# Patient Record
Sex: MALE | Race: AMERICAN INDIAN OR ALASKA NATIVE | URBAN - METROPOLITAN AREA
[De-identification: names, ages, dates, MRNs, and addresses within clinical notes are randomized per-mention and may not be internally consistent; named-entity substitution may affect disease eponyms.]

---

## 2017-06-27 PROBLEM — Z00.00 ENCOUNTER FOR PREVENTIVE HEALTH EXAMINATION: Status: ACTIVE | Noted: 2017-06-27

## 2018-11-18 ENCOUNTER — INPATIENT (INPATIENT)
Facility: HOSPITAL | Age: 58
LOS: 2 days | Discharge: HOME | End: 2018-11-21
Attending: INTERNAL MEDICINE | Admitting: INTERNAL MEDICINE
Payer: COMMERCIAL

## 2018-11-18 VITALS
OXYGEN SATURATION: 99 % | TEMPERATURE: 97 F | HEART RATE: 96 BPM | SYSTOLIC BLOOD PRESSURE: 269 MMHG | RESPIRATION RATE: 18 BRPM | DIASTOLIC BLOOD PRESSURE: 145 MMHG

## 2018-11-18 LAB
ALBUMIN SERPL ELPH-MCNC: 5.1 G/DL — SIGNIFICANT CHANGE UP (ref 3.5–5.2)
ALP SERPL-CCNC: 95 U/L — SIGNIFICANT CHANGE UP (ref 30–115)
ALT FLD-CCNC: 29 U/L — SIGNIFICANT CHANGE UP (ref 0–41)
ANION GAP SERPL CALC-SCNC: 17 MMOL/L — HIGH (ref 7–14)
APTT BLD: 33.2 SEC — SIGNIFICANT CHANGE UP (ref 27–39.2)
AST SERPL-CCNC: 24 U/L — SIGNIFICANT CHANGE UP (ref 0–41)
BASOPHILS # BLD AUTO: 0.05 K/UL — SIGNIFICANT CHANGE UP (ref 0–0.2)
BASOPHILS NFR BLD AUTO: 0.4 % — SIGNIFICANT CHANGE UP (ref 0–1)
BILIRUB SERPL-MCNC: 1 MG/DL — SIGNIFICANT CHANGE UP (ref 0.2–1.2)
BUN SERPL-MCNC: 10 MG/DL — SIGNIFICANT CHANGE UP (ref 10–20)
CALCIUM SERPL-MCNC: 9.5 MG/DL — SIGNIFICANT CHANGE UP (ref 8.5–10.1)
CHLORIDE SERPL-SCNC: 93 MMOL/L — LOW (ref 98–110)
CO2 SERPL-SCNC: 29 MMOL/L — SIGNIFICANT CHANGE UP (ref 17–32)
CREAT SERPL-MCNC: 1 MG/DL — SIGNIFICANT CHANGE UP (ref 0.7–1.5)
EOSINOPHIL # BLD AUTO: 0.05 K/UL — SIGNIFICANT CHANGE UP (ref 0–0.7)
EOSINOPHIL NFR BLD AUTO: 0.4 % — SIGNIFICANT CHANGE UP (ref 0–8)
GAS PNL BLDV: SIGNIFICANT CHANGE UP
GLUCOSE SERPL-MCNC: 121 MG/DL — HIGH (ref 70–99)
HCT VFR BLD CALC: 49.5 % — SIGNIFICANT CHANGE UP (ref 42–52)
HGB BLD-MCNC: 17.4 G/DL — SIGNIFICANT CHANGE UP (ref 14–18)
IMM GRANULOCYTES NFR BLD AUTO: 0.6 % — HIGH (ref 0.1–0.3)
INR BLD: 1.06 RATIO — SIGNIFICANT CHANGE UP (ref 0.65–1.3)
LYMPHOCYTES # BLD AUTO: 1.19 K/UL — LOW (ref 1.2–3.4)
LYMPHOCYTES # BLD AUTO: 10.1 % — LOW (ref 20.5–51.1)
MCHC RBC-ENTMCNC: 31.5 PG — HIGH (ref 27–31)
MCHC RBC-ENTMCNC: 35.2 G/DL — SIGNIFICANT CHANGE UP (ref 32–37)
MCV RBC AUTO: 89.5 FL — SIGNIFICANT CHANGE UP (ref 80–94)
MONOCYTES # BLD AUTO: 0.76 K/UL — HIGH (ref 0.1–0.6)
MONOCYTES NFR BLD AUTO: 6.5 % — SIGNIFICANT CHANGE UP (ref 1.7–9.3)
NEUTROPHILS # BLD AUTO: 9.64 K/UL — HIGH (ref 1.4–6.5)
NEUTROPHILS NFR BLD AUTO: 82 % — HIGH (ref 42.2–75.2)
PLATELET # BLD AUTO: 156 K/UL — SIGNIFICANT CHANGE UP (ref 130–400)
POTASSIUM SERPL-MCNC: 3.8 MMOL/L — SIGNIFICANT CHANGE UP (ref 3.5–5)
POTASSIUM SERPL-SCNC: 3.8 MMOL/L — SIGNIFICANT CHANGE UP (ref 3.5–5)
PROT SERPL-MCNC: 7.9 G/DL — SIGNIFICANT CHANGE UP (ref 6–8)
PROTHROM AB SERPL-ACNC: 12.2 SEC — SIGNIFICANT CHANGE UP (ref 9.95–12.87)
RBC # BLD: 5.53 M/UL — SIGNIFICANT CHANGE UP (ref 4.7–6.1)
RBC # FLD: 11.6 % — SIGNIFICANT CHANGE UP (ref 11.5–14.5)
SODIUM SERPL-SCNC: 139 MMOL/L — SIGNIFICANT CHANGE UP (ref 135–146)
TROPONIN T SERPL-MCNC: <0.01 NG/ML — SIGNIFICANT CHANGE UP
WBC # BLD: 11.76 K/UL — HIGH (ref 4.8–10.8)
WBC # FLD AUTO: 11.76 K/UL — HIGH (ref 4.8–10.8)

## 2018-11-18 RX ORDER — MORPHINE SULFATE 50 MG/1
4 CAPSULE, EXTENDED RELEASE ORAL
Qty: 0 | Refills: 0 | Status: DISCONTINUED | OUTPATIENT
Start: 2018-11-18 | End: 2018-11-19

## 2018-11-18 RX ORDER — CHLORHEXIDINE GLUCONATE 213 G/1000ML
1 SOLUTION TOPICAL
Qty: 0 | Refills: 0 | Status: DISCONTINUED | OUTPATIENT
Start: 2018-11-18 | End: 2018-11-21

## 2018-11-18 RX ORDER — MORPHINE SULFATE 50 MG/1
4 CAPSULE, EXTENDED RELEASE ORAL ONCE
Qty: 0 | Refills: 0 | Status: DISCONTINUED | OUTPATIENT
Start: 2018-11-18 | End: 2018-11-18

## 2018-11-18 RX ORDER — NICARDIPINE HYDROCHLORIDE 30 MG/1
5 CAPSULE, EXTENDED RELEASE ORAL
Qty: 40 | Refills: 0 | Status: DISCONTINUED | OUTPATIENT
Start: 2018-11-18 | End: 2018-11-18

## 2018-11-18 RX ORDER — ENOXAPARIN SODIUM 100 MG/ML
40 INJECTION SUBCUTANEOUS EVERY 24 HOURS
Qty: 0 | Refills: 0 | Status: DISCONTINUED | OUTPATIENT
Start: 2018-11-18 | End: 2018-11-21

## 2018-11-18 RX ORDER — NICARDIPINE HYDROCHLORIDE 30 MG/1
5 CAPSULE, EXTENDED RELEASE ORAL
Qty: 40 | Refills: 0 | Status: DISCONTINUED | OUTPATIENT
Start: 2018-11-18 | End: 2018-11-19

## 2018-11-18 RX ADMIN — MORPHINE SULFATE 4 MILLIGRAM(S): 50 CAPSULE, EXTENDED RELEASE ORAL at 15:41

## 2018-11-18 RX ADMIN — NICARDIPINE HYDROCHLORIDE 25 MG/HR: 30 CAPSULE, EXTENDED RELEASE ORAL at 19:00

## 2018-11-18 RX ADMIN — MORPHINE SULFATE 4 MILLIGRAM(S): 50 CAPSULE, EXTENDED RELEASE ORAL at 23:15

## 2018-11-18 RX ADMIN — MORPHINE SULFATE 4 MILLIGRAM(S): 50 CAPSULE, EXTENDED RELEASE ORAL at 16:36

## 2018-11-18 RX ADMIN — MORPHINE SULFATE 4 MILLIGRAM(S): 50 CAPSULE, EXTENDED RELEASE ORAL at 22:51

## 2018-11-18 NOTE — H&P ADULT - HISTORY OF PRESENT ILLNESS
58 y/o M no significant PMH presents to ED for chronic tooth pain s/p recent tooth capping found to have hypertensive emergency.  Pt had a capping of a left lower tooth about a month and a half ago and has been re-evaluated multiple times for continued pain. Today he presented to ED for pain once more but was found to have /145. Pt denies history of HTN and says he was last seen by PMD 3 months ago with normal BP. Pt's friend at bedside notes that this morning pt sounded "off" on the phone, he was a bit confused and slow to answer questions. Pt's friend notes that he is now improved but still not fully at baseline.  In ED pt sent for CTH stroke protocol which was negative and started on nicardipine gtt, pt then had 1 episode of watery emesis in ED    Pt endorses mildly improved tooth pain after dentistry intervention. He denies headache, dizziness, lightheadedness, Vision changes, N/V/F/C, SOB, cough, MOYA, chest pain, abdominal pain, diarrhea, constipation, dysuria, LE swelling, recent weight loss or sick contacts     ED: morphine 4mg IVP, nicardipine gtt

## 2018-11-18 NOTE — H&P ADULT - NSHPSOCIALHISTORY_GEN_ALL_CORE
Smokes 2-3 cigarettes daily  Drinks 2 beers about every 3-4 days  No drugs  ambulates without assistance

## 2018-11-18 NOTE — ED ADULT TRIAGE NOTE - CHIEF COMPLAINT QUOTE
Pt with left side dental pain, headache and dizziness, and confusion, does not recall when symptoms began

## 2018-11-18 NOTE — CONSULT NOTE ADULT - SUBJECTIVE AND OBJECTIVE BOX
Patient is a 57y old  Male who presents with a chief complaint of dental from #18    HPI: 58y/o male presents with dental pain from #18.       PAST MEDICAL & SURGICAL HISTORY:  No pertinent past medical history  No known drug allergy    ( -  ) heart valve replacement  ( -  ) joint replacement      MEDICATIONS  (STANDING): Outpatient Medication Status not yet specified    MEDICATIONS  (PRN): Outpatient Medication Status not yet specified      FAMILY HISTORY: Patient reports no pertinent family history.      *SOCIAL HISTORY: ( +  ) Tobacco; ( +  ) ETOH    Vital Signs Last 24 Hrs  T(C): 36.9 (18 Nov 2018 16:34), Max: 36.9 (18 Nov 2018 16:34)  T(F): 98.5 (18 Nov 2018 16:34), Max: 98.5 (18 Nov 2018 16:34)  HR: 106 (18 Nov 2018 18:16) (89 - 109)  BP: 231/127 (18 Nov 2018 18:16) (195/140 - 269/145)  BP(mean): --  RR: 18 (18 Nov 2018 18:16) (17 - 19)  SpO2: 98% (18 Nov 2018 18:16) (97% - 99%)    LABS:                        17.4   11.76 )-----------( 156      ( 18 Nov 2018 15:18 )             49.5     11-18    139  |  93<L>  |  10  ----------------------------<  121<H>  3.8   |  29  |  1.0    Ca    9.5      18 Nov 2018 15:18    TPro  7.9  /  Alb  5.1  /  TBili  1.0  /  DBili  x   /  AST  24  /  ALT  29  /  AlkPhos  95  11-18    WBC Count: 11.76 K/uL <H> [4.80 - 10.80] (11-18 @ 15:18)  Platelet Count - Automated: 156 K/uL [130 - 400] (11-18 @ 15:18)  INR: 1.06 ratio [0.65 - 1.30] (11-18 @ 15:18)      PT/INR - ( 18 Nov 2018 15:18 )   PT: 12.20 sec;   INR: 1.06 ratio         PTT - ( 18 Nov 2018 15:18 )  PTT:33.2 sec    Last Dental Visit: << a week ago  >>    EOE:  TMJ ( -  ) clicks                     ( -  ) pops                     ( -  ) crepitus             Mandible <<FROM>>             Facial bones and MOM <<grossly intact>>             ( -  ) trismus             ( -  ) lymphadenopathy             ( -  ) swelling             ( -  ) asymmetry             ( -  ) palpation             ( -  ) dyspnea             ( -  ) dysphagia             ( -  ) loss of consciousness    IOE:  <<permanent>> dentition:         <<multiple missing teeth>>              hard/soft palate:  ( -  ) palatal torus, <<No pathology noted>>            tongue/FOM <<No pathology noted>>            labial/buccal mucosa <<No pathology noted>>           ( +  ) percussion           ( -  ) palpation           ( -  ) swelling            ( -  ) abscess           ( -  ) sinus tract    *DENTAL RADIOGRAPHS: none    RADIOLOGY & ADDITIONAL STUDIES: none    *ASSESSMENT: #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 #18 tender to percussion.     *PLAN: Palliative dental block, follow up with private dentist.    PROCEDURE: Palliative dental block  Verbal and written consent given.  Anesthesia: <<1 carpule(1.7cc) 0.5% marcaine with 1:200,000 epinephrine via left inferior alveolar nerve block. >>   Treatment: <<palliative dental dental    >>     RECOMMENDATIONS:  1) Discharge with Amoxicillin.  2) Dental F/U with outpatient dentist for comprehensive dental care.   3) If any difficulty swallowing/breathing, fever occur, return to ER.     Santiago Gastelum, DMD  [phone]    Resident Name, pager #

## 2018-11-18 NOTE — ED ADULT NURSE NOTE - OBJECTIVE STATEMENT
pt presents with intermittent right facial pain x one month and half s/p a dental root canal (right lower dental pain). pt reports pain worsen started last night. pt denies fever, chills , or discharge. pt states pain worsen after eating. pt denies chest pain, SOB, weakness, dizziness, headache,  or change in vision. no neuro deficit noted at this time. no facial droop. hand  are equal bilaterally. upper and lower extremities are equal in strength bilaterally. pt reports one episode of vomiting today at 1pm. no active vomiting at this time but pt reports nausea. pt presents with intermittent right facial pain x one month and half s/p a dental root canal (right lower dental pain). pt reports pain worsen started last night. pt denies fever, chills , or discharge. pt states pain worsen after eating. pt denies chest pain, SOB, weakness, dizziness, headache,  or change in vision. no neuro deficit noted at this time. no facial droop. hand  are equal bilaterally. upper and lower extremities are equal in strength bilaterally. pt reports one episode of vomiting today at 1pm. no active vomiting at this time but pt reports nausea. pt is accompanied by a friend who states the pt has an episode this morning where he was antlered compared to his baseline but at this time, pt is at his baseline with no sign of deficit. pt presents with intermittent left facial pain x one month and half s/p a dental root canal (left lower dental pain). pt reports pain worsen started last night. pt denies fever, chills , or discharge. pt states pain worsen after eating. pt denies chest pain, SOB, weakness, dizziness, headache,  or change in vision. no neuro deficit noted at this time. no facial droop. hand  are equal bilaterally. upper and lower extremities are equal in strength bilaterally. pt reports one episode of vomiting today at 1pm. no active vomiting at this time but pt reports nausea. pt is accompanied by a friend who states the pt has an episode this morning on the phone where he was altered compared (slow and confused) to his baseline but at this time, pt is at his baseline with no sign of deficit.

## 2018-11-18 NOTE — H&P ADULT - NSHPLABSRESULTS_GEN_ALL_CORE
Complete Blood Count + Automated Diff (11.18.18 @ 15:18)    WBC Count: 11.76 K/uL    RBC Count: 5.53 M/uL    Hemoglobin: 17.4 g/dL    Hematocrit: 49.5 %    Mean Cell Volume: 89.5 fL    Mean Cell Hemoglobin: 31.5 pg    Mean Cell Hemoglobin Conc: 35.2 g/dL    Red Cell Distrib Width: 11.6 %    Platelet Count - Automated: 156 K/uL    Auto Neutrophil #: 9.64 K/uL    Auto Lymphocyte #: 1.19 K/uL    Auto Monocyte #: 0.76 K/uL    Auto Eosinophil #: 0.05 K/uL    Auto Basophil #: 0.05 K/uL    Auto Neutrophil %: 82.0: Differential percentages must be correlated with absolute numbers for  clinical significance. %    Auto Lymphocyte %: 10.1 %    Auto Monocyte %: 6.5 %    Auto Eosinophil %: 0.4 %    Auto Basophil %: 0.4 %    Auto Immature Granulocyte %: 0.6 %    Comprehensive Metabolic Panel (11.18.18 @ 15:18)    Sodium, Serum: 139 mmol/L    Potassium, Serum: 3.8 mmol/L    Chloride, Serum: 93 mmol/L    Carbon Dioxide, Serum: 29 mmol/L    Anion Gap, Serum: 17 mmol/L    Blood Urea Nitrogen, Serum: 10 mg/dL    Creatinine, Serum: 1.0 mg/dL    Glucose, Serum: 121 mg/dL    Calcium, Total Serum: 9.5 mg/dL    Protein Total, Serum: 7.9 g/dL    Albumin, Serum: 5.1 g/dL    Bilirubin Total, Serum: 1.0 mg/dL    Alkaline Phosphatase, Serum: 95 U/L    Aspartate Aminotransferase (AST/SGOT): 24 U/L    Alanine Aminotransferase (ALT/SGPT): 29 U/L    eGFR if Non : 83    eGFR if : 96 mL/min/1.73M2    Prothrombin Time and INR, Plasma (11.18.18 @ 15:18)    Prothrombin Time, Plasma: 12.20 sec    INR: 1.06  Activated Partial Thromboplastin Time (11.18.18 @ 15:18)    Activated Partial Thromboplastin Time: 33.2    Troponin T, Serum (11.18.18 @ 15:18)    Troponin T, Serum: <0.01 ng/mL    < from: CT Brain Stroke Protocol (11.18.18 @ 16:54) >  PROCEDURE DATE:  11/18/2018    INTERPRETATION:  Clinical History / Reason for exam: Altered mental   status.  Technique: Multiple contiguous axial CT images were obtained from the   base of the skull to the vertex without administration of intravenous   contrast. Axial, coronal and sagittal images were reviewed.  Comparison: None.  Findings:   The ventricles, basal cisterns and sulcal pattern are within normal   limits for the patient'sstated age.    The gray-white matter differentiation is preserved.  There is no acute mass effect, midline shift or intracranial hemorrhage.    The imaged paranasal sinuses and bilateral mastoid complexes are   unremarkable.  No evidence of adepressed skull fracture.    IMPRESSION:   No evidence of acute intracranial pathology.  Dr. Fracisco Voss discussed preliminary findings with NIKHIL ASHER on   11/18/2018 5:14 PM with readback.  < end of copied text >

## 2018-11-18 NOTE — H&P ADULT - NSHPPHYSICALEXAM_GEN_ALL_CORE
GENERAL: NAD  HEENT: NCAT, PERRL 3mm b/l  CHEST/LUNG: CTAB  HEART: Tachycardic, Regular rhythm; s1 s2 appreciated, No murmurs, rubs, or gallops  ABDOMEN: Soft, Nontender, Nondistended; Bowel sounds present  EXTREMITIES: No LE edema b/l  NERVOUS SYSTEM:  Alert & Oriented X3

## 2018-11-18 NOTE — ED PROVIDER NOTE - PHYSICAL EXAMINATION
CONSTITUTIONAL: Well-developed; well-nourished; in no acute distress.   SKIN: warm, dry  HEAD: Normocephalic; atraumatic.  EYES: PERRL, EOMI, normal sclera and conjunctiva   ENT: No nasal discharge; airway clear.  NECK: Supple; non tender.  CARD: S1, S2 normal; no murmurs, gallops, or rubs. Regular rate and rhythm.   RESP: No wheezes, rales or rhonchi.  ABD: soft ntnd  EXT: Normal ROM.  No clubbing, cyanosis or edema.   LYMPH: No acute cervical adenopathy.  NEURO: Alert, oriented, grossly unremarkable. No cranial nerve deficits. Moving all extremities with normal strength and sensation. No pronator drift. No cerebellar signs.   PSYCH: Cooperative, appropriate.

## 2018-11-18 NOTE — ED ADULT NURSE REASSESSMENT NOTE - NS ED NURSE REASSESS COMMENT FT1
pt had an episode of vomiting with diaphoresis. pt denies SOB or chest pain . pt on continuos cardiac monitoring . vs is closely monitored

## 2018-11-18 NOTE — ED PROVIDER NOTE - OBJECTIVE STATEMENT
57 y m no pmh pw AMS. Pt presented for dental pain. Had a root canal done a few months ago. Called his friend on the phone around 1030 this AM and friend said he sounded slow to respond and more confused. Last known well/last time heard from was 2 days ago. Pt currently endorsing only L sided lower dental pain. Denies cp, sob, abd pain, n/v, dizziness.

## 2018-11-18 NOTE — ED PROVIDER NOTE - MEDICAL DECISION MAKING DETAILS
56 Y/O M NO SIG PMHX PRESENTED WITH DENTAL PAIN. PT WITH MARKEDLY ELEVATED BP. PT WITH CONFUSION EARLIER IN THE DAY AND VOMITING WHILE IN THE ED. ANTI HTN GTT INITIATED AND PT ADMITTED TO ICU.

## 2018-11-18 NOTE — ED PROVIDER NOTE - ATTENDING CONTRIBUTION TO CARE
57 year old man no PMH p/w AMS. He reports 2 day history of dental pain that he was concerned about, but his friend called him on the phone and said he was "confused." Specifically, he was very slow to respond and did not sound his usual self, although he was not disoriented. Friend says last time he spoke to patient was day before yesterday and he was his normal self. Pt aside from left dental pain has no other complaints, denies headache, SOB, chest pain, neuro symptoms. On exam CN2-12 intact, alert and oriented no dysarthria, strength/sensation intact throughout NIHSS 0. No distress, EOMI, MMM, no JVD, peripheral pulses equal throughout, lungs clear b/l, abd s/ntnd, heart RRR no m/r/g, ext FROM throughout no calf tenderness or swelling. A/p: CVA vs hypertensive urgency - CT, pain control, BP control as needed, labs, reassess. Out of window for any intervention and stroke scale low.

## 2018-11-18 NOTE — ED ADULT NURSE REASSESSMENT NOTE - NS ED NURSE REASSESS COMMENT FT1
pt continue to be hypertensive. pt denies chest pain , headache or change in vision. no evidence of neuro deficit. MD aware. Safety maintained, Will continue to monitor

## 2018-11-18 NOTE — ED PROVIDER NOTE - NS ED ROS FT
Eyes:  No visual changes, eye pain or discharge.  ENMT: L dental pain. No hearing changes, pain, discharge or infections. No neck pain or stiffness.  Cardiac:  No chest pain, SOB or edema.   Respiratory:  No cough or respiratory distress.   GI:  No nausea, vomiting, diarrhea or abdominal pain.  :  No dysuria, frequency or burning.  MS:  No myalgia, muscle weakness, joint pain or back pain.  Neuro:  No headache or weakness.  No LOC.  Skin:  No skin rash.   Endocrine: No history of thyroid disease or diabetes.

## 2018-11-18 NOTE — ED ADULT NURSE NOTE - NSIMPLEMENTINTERV_GEN_ALL_ED
Implemented All Universal Safety Interventions:  [place] to call system. Call bell, personal items and telephone within reach. Instruct patient to call for assistance. Room bathroom lighting operational. Non-slip footwear when patient is off stretcher. Physically safe environment: no spills, clutter or unnecessary equipment. Stretcher in lowest position, wheels locked, appropriate side rails in place.

## 2018-11-18 NOTE — ED ADULT NURSE NOTE - CHPI ED NUR SYMPTOMS NEG
no dizziness/no confusion/no loss of consciousness/no numbness/no weakness/no fever/no blurred vision/no change in level of consciousness

## 2018-11-18 NOTE — H&P ADULT - ASSESSMENT
56 y/o M no significant PMH presents to ED for chronic tooth pain s/p recent tooth capping found to have hypertensive emergency.    IMPRESSION:  Hypertensive Emergency  Mild AMS  Tooth pain    PLAN  NEURO:   -CTH negative for acute pathology  -neuro checks  -no sedation  -pain management PRN    HEENT:  -dental recs appreciated: #18 crown fracture, s/p marcaine/epi nerve block,   -dental follow up    PULMONARY:   -HOB 45 deg  -aspiration precautions    CARDIOVASCULAR:   -c/w nicardipine gtt, titrate to goal  overnight  -2D echo    GASTROENTEROLOGY:   -NPO for now with small sips/ice chips, if tolerating small amounts of liquid can slowly advance    INFECTIOUS DISEASES:   -check Bcx/Ucx    HEMATOLOGY/ONCOLOGY:   -DVT ppx lovenox    GENITOURINARY/RENAL:   -nephro eval  -urine protien/Creatine ratio  -urine metanepharines, methylmalonic acid  -renal artery duplex  -monitor and correct electrolytes    ENDOCRINE:   -check TSH  -check Hbg a1c  -monitor FS, start insulin protocol PRN    MUSCULOSKELETAL:   -Activity bedrest for now, advance once BP improved    DISPO:   -MICU monitoring for now    CODE STATUS:  -d/w pt FULL CODE

## 2018-11-18 NOTE — ED ADULT NURSE REASSESSMENT NOTE - NS ED NURSE REASSESS COMMENT FT1
pt reports improvement in pain post pain med rating 7/10. pt is hemodynamically stable at this time. pt continue to be hypertensive, VS is closely monitored. ED MD aware od pt's VS. pt was taken by transport to CT scan. pt aware of the plan of care and has no questions or concerns at this time. Safety maintained, Will continue to monitor .

## 2018-11-18 NOTE — ED ADULT NURSE REASSESSMENT NOTE - NS ED NURSE REASSESS COMMENT FT1
pt returned from CT scan , tolerated well. pt continue to c/o right dental pain requesting additional pain meds. MD made aware of pt's request . pt was placed back on cardiac monitor. VS rechecked and MD notified pt returned from CT scan , tolerated well. pt continue to c/o left dental pain requesting additional pain meds. MD made aware of pt's request . pt was placed back on cardiac monitor. VS rechecked and MD notified

## 2018-11-18 NOTE — ED PROVIDER NOTE - PROGRESS NOTE DETAILS
PT SIGNED OUT TO ME BY DR. MARADIAGA, FOLLOW UP CT HEAD, LAB RESULTS, DENTAL CONSULTATION, REASSESS AND DISPO. PT WITH MARKEDLY ELEVATED BP DESPITE OPIOIDS AND DENTA LBLOCK FOR PAIN CONTROL. PT NOW VOMITING. NO HA, VISION/SPEECH CHANGES. NEURO EXAM REMAINS NONFOCAL. WILL START CARDENE. 58 Y/O M NO SIG PMHS, LAST SEEN BY DR. STODDARD 3 MONTHS AGO AND HAD NORMAL EXAM AND VITALS PRESENTED WITH DENTAL PAIN. FRIEND AT BEDSIDE IN ED REPORTED PT WITH CONFUSION AND WAS SLOW TO RESPOND TO QUESTIONS EARLIER HOWEVER WAS AT BASELINE IN ED. PT WITH PERSISTENTLY ELEVATED BP. PT C/O ONLY DENTAL PAIN. CONSIDERING CONFUSION, AND VOMITING IN SETTING OF ELEVATED BP INITAITED ANTI HTN GTT AND WILL ADMIT. CASE D/W DR. NOVA, ADMIT TO ICU.

## 2018-11-19 LAB
ANION GAP SERPL CALC-SCNC: 14 MMOL/L — SIGNIFICANT CHANGE UP (ref 7–14)
APPEARANCE UR: ABNORMAL
BASOPHILS # BLD AUTO: 0.02 K/UL — SIGNIFICANT CHANGE UP (ref 0–0.2)
BASOPHILS NFR BLD AUTO: 0.2 % — SIGNIFICANT CHANGE UP (ref 0–1)
BILIRUB UR-MCNC: NEGATIVE — SIGNIFICANT CHANGE UP
BUN SERPL-MCNC: 13 MG/DL — SIGNIFICANT CHANGE UP (ref 10–20)
CALCIUM SERPL-MCNC: 9 MG/DL — SIGNIFICANT CHANGE UP (ref 8.5–10.1)
CHLORIDE SERPL-SCNC: 97 MMOL/L — LOW (ref 98–110)
CO2 SERPL-SCNC: 28 MMOL/L — SIGNIFICANT CHANGE UP (ref 17–32)
COLOR SPEC: YELLOW — SIGNIFICANT CHANGE UP
CREAT ?TM UR-MCNC: 58 MG/DL — SIGNIFICANT CHANGE UP
CREAT SERPL-MCNC: 0.9 MG/DL — SIGNIFICANT CHANGE UP (ref 0.7–1.5)
DIFF PNL FLD: NEGATIVE — SIGNIFICANT CHANGE UP
EOSINOPHIL # BLD AUTO: 0.03 K/UL — SIGNIFICANT CHANGE UP (ref 0–0.7)
EOSINOPHIL NFR BLD AUTO: 0.3 % — SIGNIFICANT CHANGE UP (ref 0–8)
EPI CELLS # UR: ABNORMAL /HPF
ESTIMATED AVERAGE GLUCOSE: 108 MG/DL — SIGNIFICANT CHANGE UP (ref 68–114)
GLUCOSE BLDC GLUCOMTR-MCNC: 132 MG/DL — HIGH (ref 70–99)
GLUCOSE SERPL-MCNC: 109 MG/DL — HIGH (ref 70–99)
GLUCOSE UR QL: NEGATIVE MG/DL — SIGNIFICANT CHANGE UP
HBA1C BLD-MCNC: 5.4 % — SIGNIFICANT CHANGE UP (ref 4–5.6)
HCT VFR BLD CALC: 43.2 % — SIGNIFICANT CHANGE UP (ref 42–52)
HGB BLD-MCNC: 15.5 G/DL — SIGNIFICANT CHANGE UP (ref 14–18)
IMM GRANULOCYTES NFR BLD AUTO: 0.3 % — SIGNIFICANT CHANGE UP (ref 0.1–0.3)
KETONES UR-MCNC: NEGATIVE — SIGNIFICANT CHANGE UP
LEUKOCYTE ESTERASE UR-ACNC: NEGATIVE — SIGNIFICANT CHANGE UP
LYMPHOCYTES # BLD AUTO: 0.98 K/UL — LOW (ref 1.2–3.4)
LYMPHOCYTES # BLD AUTO: 10.5 % — LOW (ref 20.5–51.1)
MAGNESIUM SERPL-MCNC: 1.9 MG/DL — SIGNIFICANT CHANGE UP (ref 1.8–2.4)
MCHC RBC-ENTMCNC: 32.2 PG — HIGH (ref 27–31)
MCHC RBC-ENTMCNC: 35.9 G/DL — SIGNIFICANT CHANGE UP (ref 32–37)
MCV RBC AUTO: 89.8 FL — SIGNIFICANT CHANGE UP (ref 80–94)
MONOCYTES # BLD AUTO: 0.92 K/UL — HIGH (ref 0.1–0.6)
MONOCYTES NFR BLD AUTO: 9.9 % — HIGH (ref 1.7–9.3)
NEUTROPHILS # BLD AUTO: 7.35 K/UL — HIGH (ref 1.4–6.5)
NEUTROPHILS NFR BLD AUTO: 78.8 % — HIGH (ref 42.2–75.2)
NITRITE UR-MCNC: NEGATIVE — SIGNIFICANT CHANGE UP
NRBC # BLD: 0 /100 WBCS — SIGNIFICANT CHANGE UP (ref 0–0)
PH UR: 7 — SIGNIFICANT CHANGE UP (ref 5–8)
PLATELET # BLD AUTO: 154 K/UL — SIGNIFICANT CHANGE UP (ref 130–400)
POTASSIUM SERPL-MCNC: 4 MMOL/L — SIGNIFICANT CHANGE UP (ref 3.5–5)
POTASSIUM SERPL-SCNC: 4 MMOL/L — SIGNIFICANT CHANGE UP (ref 3.5–5)
PROT ?TM UR-MCNC: 5 MG/DLG/24H — SIGNIFICANT CHANGE UP
PROT UR-MCNC: NEGATIVE MG/DL — SIGNIFICANT CHANGE UP
PROT/CREAT UR-RTO: 0.1 RATIO — SIGNIFICANT CHANGE UP (ref 0–0.2)
RBC # BLD: 4.81 M/UL — SIGNIFICANT CHANGE UP (ref 4.7–6.1)
RBC # FLD: 11.8 % — SIGNIFICANT CHANGE UP (ref 11.5–14.5)
SODIUM SERPL-SCNC: 139 MMOL/L — SIGNIFICANT CHANGE UP (ref 135–146)
SP GR SPEC: 1.01 — SIGNIFICANT CHANGE UP (ref 1.01–1.03)
TROPONIN T SERPL-MCNC: <0.01 NG/ML — SIGNIFICANT CHANGE UP
TSH SERPL-MCNC: 1.7 UIU/ML — SIGNIFICANT CHANGE UP (ref 0.27–4.2)
UROBILINOGEN FLD QL: 1 MG/DL (ref 0.2–0.2)
WBC # BLD: 9.33 K/UL — SIGNIFICANT CHANGE UP (ref 4.8–10.8)
WBC # FLD AUTO: 9.33 K/UL — SIGNIFICANT CHANGE UP (ref 4.8–10.8)

## 2018-11-19 PROCEDURE — 93979 VASCULAR STUDY: CPT | Mod: 26

## 2018-11-19 RX ORDER — OXYCODONE AND ACETAMINOPHEN 5; 325 MG/1; MG/1
1 TABLET ORAL EVERY 6 HOURS
Qty: 0 | Refills: 0 | Status: DISCONTINUED | OUTPATIENT
Start: 2018-11-19 | End: 2018-11-21

## 2018-11-19 RX ORDER — OXYCODONE AND ACETAMINOPHEN 5; 325 MG/1; MG/1
1 TABLET ORAL ONCE
Qty: 0 | Refills: 0 | Status: DISCONTINUED | OUTPATIENT
Start: 2018-11-19 | End: 2018-11-19

## 2018-11-19 RX ORDER — AMLODIPINE BESYLATE 2.5 MG/1
5 TABLET ORAL DAILY
Qty: 0 | Refills: 0 | Status: DISCONTINUED | OUTPATIENT
Start: 2018-11-19 | End: 2018-11-20

## 2018-11-19 RX ADMIN — OXYCODONE AND ACETAMINOPHEN 1 TABLET(S): 5; 325 TABLET ORAL at 18:00

## 2018-11-19 RX ADMIN — ENOXAPARIN SODIUM 40 MILLIGRAM(S): 100 INJECTION SUBCUTANEOUS at 05:15

## 2018-11-19 RX ADMIN — MORPHINE SULFATE 4 MILLIGRAM(S): 50 CAPSULE, EXTENDED RELEASE ORAL at 01:47

## 2018-11-19 RX ADMIN — OXYCODONE AND ACETAMINOPHEN 1 TABLET(S): 5; 325 TABLET ORAL at 10:00

## 2018-11-19 RX ADMIN — OXYCODONE AND ACETAMINOPHEN 1 TABLET(S): 5; 325 TABLET ORAL at 09:49

## 2018-11-19 RX ADMIN — Medication 1 TABLET(S): at 18:28

## 2018-11-19 RX ADMIN — OXYCODONE AND ACETAMINOPHEN 1 TABLET(S): 5; 325 TABLET ORAL at 16:52

## 2018-11-19 RX ADMIN — OXYCODONE AND ACETAMINOPHEN 1 TABLET(S): 5; 325 TABLET ORAL at 23:10

## 2018-11-19 RX ADMIN — OXYCODONE AND ACETAMINOPHEN 1 TABLET(S): 5; 325 TABLET ORAL at 22:40

## 2018-11-19 RX ADMIN — NICARDIPINE HYDROCHLORIDE 25 MG/HR: 30 CAPSULE, EXTENDED RELEASE ORAL at 00:52

## 2018-11-19 RX ADMIN — AMLODIPINE BESYLATE 5 MILLIGRAM(S): 2.5 TABLET ORAL at 10:00

## 2018-11-19 RX ADMIN — MORPHINE SULFATE 4 MILLIGRAM(S): 50 CAPSULE, EXTENDED RELEASE ORAL at 01:15

## 2018-11-19 NOTE — PROGRESS NOTE ADULT - ASSESSMENT
58 y/o M no significant PMH presents to ED for chronic tooth pain s/p recent tooth capping found to have hypertensive emergency.    IMPRESSION:  Hypertensive Emergency  Mild AMS  Tooth pain    PLAN  NEURO:   -CTH negative for acute pathology  -neuro checks  -no sedation  -pain management PRN    HEENT:  -dental recs appreciated: #18 crown fracture, s/p marcaine/epi nerve block,   -dental follow up  -percocet PRN  -Po amoxicillin     PULMONARY:   -HOB 45 deg  -aspiration precautions    CARDIOVASCULAR:   -Started norvasc 5mg daily   -2D echo    GASTROENTEROLOGY:   -DASH/TLC diet     INFECTIOUS DISEASES:   -check Bcx/Ucx    HEMATOLOGY/ONCOLOGY:   -DVT ppx lovenox    GENITOURINARY/RENAL:   -nephro eval  -urine protien/Creatine ratio  -urine metanepharines, methylmalonic acid  -renal artery duplex  -monitor and correct electrolytes    ENDOCRINE:   -check TSH  -check Hbg a1c  -monitor FS, start insulin protocol PRN    MUSCULOSKELETAL:   -Activity bedrest for now, advance once BP improved    DISPO: downgrade to floor     CODE STATUS:  -d/w pt FULL CODE

## 2018-11-19 NOTE — CONSULT NOTE ADULT - SUBJECTIVE AND OBJECTIVE BOX
NEPHROLOGY CONSULTATION NOTE    Patient is a 57y Male whom presented to the hospital with     PAST MEDICAL & SURGICAL HISTORY:  No pertinent past medical history  No significant past surgical history    Allergies:  No Known Allergies    Home Medications:  Advil:  (2018 21:05)  glucosamine:  (2018 21:05)      Hospital Medications:   MEDICATIONS  (STANDING):  amLODIPine   Tablet 5 milliGRAM(s) Oral daily  amoxicillin  875 milliGRAM(s)/clavulanate 1 Tablet(s) Oral two times a day  chlorhexidine 4% Liquid 1 Application(s) Topical <User Schedule>  enoxaparin Injectable 40 milliGRAM(s) SubCutaneous every 24 hours  niCARdipine Infusion 5 mG/Hr (25 mL/Hr) IV Continuous <Continuous>      SOCIAL HISTORY:  Denies ETOH,Smoking    FAMILY HISTORY:  No pertinent family history in first degree relatives        REVIEW OF SYSTEMS:  CONSTITUTIONAL: No weakness, fevers or chills  EYES/ENT: No visual changes;  No vertigo or throat pain   NECK: No pain or stiffness  RESPIRATORY: No cough, wheezing, hemoptysis; No shortness of breath  CARDIOVASCULAR: No chest pain or palpitations.  GASTROINTESTINAL: No abdominal or epigastric pain. No nausea, vomiting, or hematemesis; No diarrhea or constipation. No melena or hematochezia.  GENITOURINARY: No dysuria, frequency, foamy urine, urinary urgency, incontinence or hematuria  NEUROLOGICAL: No numbness or weakness  SKIN: No itching, burning, rashes, or lesions   VASCULAR: No bilateral lower extremity edema.   All other review of systems is negative unless indicated above.    VITALS:  T(F): 97.3 (18 @ 12:00), Max: 98.6 (18 @ 20:27)  HR: 78 (18 @ 12:00)  BP: 182/108 (18 @ 12:00)  RR: 12 (18 @ 12:00)  SpO2: 96% (18 @ 12:00)     @ 07:01  -   @ 07:00  --------------------------------------------------------  IN: 40 mL / OUT: 1100 mL / NET: -1060 mL     @ 07:01  -   @ 12:56  --------------------------------------------------------  IN: 360 mL / OUT: 700 mL / NET: -340 mL      Height (cm): 175.26 ( 00:43)  Weight (kg): 66.7 ( 00:43)  BMI (kg/m2): 21.7 (:43)  BSA (m2): 1.81 ( 00:43)      I&O's Detail    2018 07:  -  2018 07:00  --------------------------------------------------------  IN:    niCARdipine Infusion: 40 mL  Total IN: 40 mL    OUT:    Voided: 1100 mL  Total OUT: 1100 mL    Total NET: -1060 mL      2018 07:  -  2018 12:56  --------------------------------------------------------  IN:    Oral Fluid: 360 mL  Total IN: 360 mL    OUT:    Voided: 700 mL  Total OUT: 700 mL    Total NET: -340 mL            PHYSICAL EXAM:  Constitutional: NAD  HEENT: anicteric sclera, oropharynx clear, MMM  Neck: No JVD  Respiratory: CTAB, no wheezes, rales or rhonchi  Cardiovascular: S1, S2, RRR  Gastrointestinal: BS+, soft, NT/ND  Extremities: No cyanosis or clubbing. No peripheral edema  Neurological: A/O x 3, no focal deficits  Psychiatric: Normal mood, normal affect  : No CVA tenderness. No cabrales.   Skin: No rashes  Vascular Access:    LABS:      139  |  97<L>  |  13  ----------------------------<  109<H>  4.0   |  28  |  0.9    Ca    9.0      2018 04:14  Mg     1.9     -    TPro  7.9  /  Alb  5.1  /  TBili  1.0  /  DBili      /  AST  24  /  ALT  29  /  AlkPhos  95  11-18    Creatinine Trend: 0.9 <--, 1.0 <--                        15.5   9.33  )-----------( 154      ( 2018 04:14 )             43.2     Urine Studies:  Urinalysis Basic - ( 2018 07:47 )    Color: Yellow / Appearance: Cloudy / S.010 / pH:   Gluc:  / Ketone: Negative  / Bili: Negative / Urobili: 1.0 mg/dL   Blood:  / Protein: Negative mg/dL / Nitrite: Negative   Leuk Esterase: Negative / RBC:  / WBC    Sq Epi:  / Non Sq Epi: Occasional /HPF / Bacteria:                 RADIOLOGY & ADDITIONAL STUDIES: NEPHROLOGY CONSULTATION NOTE    Patient is a 56 y/o male with no past medical history whom presented to the hospital for left lower tooth pain s/p capping, found to have a BP of 269/145, and admitted and consulted to nephrology for hypertensive emergency and AMS. His friend noted that the patient was confused and slow to answer questions, currently resolved. He was given morphine, nicardipine gtt in ED, currently off, as well as amlodipine and enoxaparin for blood pressure and amoxicillin and chlorhexidine for tooth complaint. Currently off nicardipine drip. The patient denies any headache, dizziness, lightheadedness, vision changes, N/V/D/C, F/C, SOB, cough, MOYA, chest pain, abdominal pain, dysuria, hesitancy, LE swelling, recent stressful encounter.    PAST MEDICAL & SURGICAL HISTORY:  No pertinent past medical history  No significant past surgical history    Allergies:  No Known Allergies    Home Medications:  Advil:  (2018 21:05)  glucosamine:  (2018 21:05)      Hospital Medications:   MEDICATIONS  (STANDING):  amLODIPine   Tablet 5 milliGRAM(s) Oral daily  amoxicillin  875 milliGRAM(s)/clavulanate 1 Tablet(s) Oral two times a day  chlorhexidine 4% Liquid 1 Application(s) Topical <User Schedule>  enoxaparin Injectable 40 milliGRAM(s) SubCutaneous every 24 hours  niCARdipine Infusion 5 mG/Hr (25 mL/Hr) IV Continuous <Continuous>      SOCIAL HISTORY:  Denies ETOH, Smoking    FAMILY HISTORY:  No pertinent family history in first degree relatives      REVIEW OF SYSTEMS:  CONSTITUTIONAL: No weakness, fevers or chills  EYES/ENT: No visual changes;  No vertigo or throat pain   NECK: No pain or stiffness  RESPIRATORY: No cough, wheezing, hemoptysis; No shortness of breath  CARDIOVASCULAR: No chest pain or palpitations.  GASTROINTESTINAL: No abdominal or epigastric pain. No nausea, vomiting, or hematemesis; No diarrhea or constipation. No melena or hematochezia.  GENITOURINARY: No dysuria, frequency, foamy urine, urinary urgency, incontinence or hematuria  NEUROLOGICAL: No numbness or weakness  SKIN: No itching, burning, rashes, or lesions   VASCULAR: No bilateral lower extremity edema.   All other review of systems is negative unless indicated above.    VITALS:  T(F): 97.3 (18 @ 12:00), Max: 98.6 (18 @ 20:27)  HR: 78 (18 @ 12:00)  BP: 182/108 (18 @ 12:00)  RR: 12 (18 @ 12:00)  SpO2: 96% (18:00)     07:01  -   07:00  --------------------------------------------------------  IN: 40 mL / OUT: 1100 mL / NET: -1060 mL     07:01  -   12:56  --------------------------------------------------------  IN: 360 mL / OUT: 700 mL / NET: -340 mL      Height (cm): 175.26 ( 00:43)  Weight (kg): 66.7 ( 00:43)  BMI (kg/m2): 21.7 (:43)  BSA (m2): 1.81 ( 00:43)      I&O's Detail    2018 07:  -  2018 07:00  --------------------------------------------------------  IN:    niCARdipine Infusion: 40 mL  Total IN: 40 mL    OUT:    Voided: 1100 mL  Total OUT: 1100 mL    Total NET: -1060 mL      2018 07:  -  2018 12:56  --------------------------------------------------------  IN:    Oral Fluid: 360 mL  Total IN: 360 mL    OUT:    Voided: 700 mL  Total OUT: 700 mL    Total NET: -340 mL            PHYSICAL EXAM:  Constitutional: NAD  HEENT: anicteric sclera, oropharynx clear, MMM  Neck: No JVD  Respiratory: CTAB, no wheezes, rales or rhonchi  Cardiovascular: S1, S2, RRR  Gastrointestinal: BS+, soft, NT/ND  Extremities: No cyanosis or clubbing. No peripheral edema  Neurological: A/O x 3, no focal deficits  Psychiatric: Normal mood, normal affect  : No CVA tenderness. No cabrales.   Skin: No rashes  Vascular Access:    LABS:      139  |  97<L>  |  13  ----------------------------<  109<H>  4.0   |  28  |  0.9    Ca    9.0      2018 04:14  Mg     1.9         TPro  7.9  /  Alb  5.1  /  TBili  1.0  /  DBili      /  AST  24  /  ALT  29  /  AlkPhos  95      Creatinine Trend: 0.9 <--, 1.0 <--                        15.5   9.33  )-----------( 154      ( 2018 04:14 )             43.2     Urine Studies:  Urinalysis Basic - ( 2018 07:47 )    Color: Yellow / Appearance: Cloudy / S.010 / pH:   Gluc:  / Ketone: Negative  / Bili: Negative / Urobili: 1.0 mg/dL   Blood:  / Protein: Negative mg/dL / Nitrite: Negative   Leuk Esterase: Negative / RBC:  / WBC    Sq Epi:  / Non Sq Epi: Occasional /HPF / Bacteria:                 RADIOLOGY & ADDITIONAL STUDIES: NEPHROLOGY CONSULTATION NOTE    Patient is a 56 y/o male with no past medical history whom presented to the hospital for left lower tooth pain s/p capping, found to have a BP of 269/145, and admitted and consulted to nephrology for hypertensive emergency and AMS. His friend noted that the patient was confused and slow to answer questions, currently resolved. He was given morphine, nicardipine gtt in ED, currently off, as well as amlodipine and enoxaparin for blood pressure and amoxicillin and chlorhexidine for tooth complaint. Currently off nicardipine drip. The patient denies any headache, dizziness, lightheadedness, vision changes, N/V/D/C, F/C, SOB, cough, MOYA, chest pain, abdominal pain, dysuria, hesitancy, LE swelling, recent stressful encounter.    PAST MEDICAL & SURGICAL HISTORY:  No pertinent past medical history  No significant past surgical history    Allergies:  No Known Allergies    Home Medications:  Advil:  (2018 21:05)  glucosamine:  (2018 21:05)      Hospital Medications:   MEDICATIONS  (STANDING):  amLODIPine   Tablet 5 milliGRAM(s) Oral daily  amoxicillin  875 milliGRAM(s)/clavulanate 1 Tablet(s) Oral two times a day  chlorhexidine 4% Liquid 1 Application(s) Topical <User Schedule>  enoxaparin Injectable 40 milliGRAM(s) SubCutaneous every 24 hours  niCARdipine Infusion 5 mG/Hr (25 mL/Hr) IV Continuous <Continuous>      SOCIAL HISTORY:  Denies ETOH, Smoking    FAMILY HISTORY:  No pertinent family history in first degree relatives      REVIEW OF SYSTEMS:  All other review of systems is negative unless indicated above.    VITALS:  T(F): 97.3 (18 @ 12:00), Max: 98.6 (18 @ 20:27)  HR: 78 (18 @ 12:00) @ 14:00 currently 100  BP: 182/108 (18 @ 12:00)  	@ 14:00 currently 194/99 to LUE and 159/97 to RUE  RR: 12 (18 @ 12:00)  SpO2: 96% (18 @ 12:00)     @ 07:01  -   @ 07:00  --------------------------------------------------------  IN: 40 mL / OUT: 1100 mL / NET: -1060 mL     @ 07:01  -   @ 12:56  --------------------------------------------------------  IN: 360 mL / OUT: 700 mL / NET: -340 mL      Height (cm): 175.26 ( 00:43)  Weight (kg): 66.7 (:43)  BMI (kg/m2): 21.7 (:43)  BSA (m2): 1.81 ( 00:43)      I&O's Detail    2018 07:  -  2018 07:00  --------------------------------------------------------  IN:    niCARdipine Infusion: 40 mL  Total IN: 40 mL    OUT:    Voided: 1100 mL  Total OUT: 1100 mL    Total NET: -1060 mL      2018 07:  -  2018 12:56  --------------------------------------------------------  IN:    Oral Fluid: 360 mL  Total IN: 360 mL    OUT:    Voided: 700 mL  Total OUT: 700 mL    Total NET: -340 mL      PHYSICAL EXAM:  Constitutional: NAD, comfortable  HEENT: anicteric sclera, oropharynx clear, MMM, minimal swelling adjacent to left lower dentition. No mon facies.  Neck: No JVD, minimal lymph node swelling and tenderness to L preauricular nodes  Respiratory: CTAB, no wheezes, rales or rhonchi  Cardiovascular: S1, S2, regular rhythm, tachycardic to 100 bpm  Gastrointestinal: BS+, soft, NT/ND  Extremities: No cyanosis or clubbing. No peripheral edema. Femoral pulses 2+. No acral enlargement or dorsocervical fat pad.  Neurological: A/O x 3, no focal deficits, no confusion noted  Psychiatric: Normal mood, normal affect  : No CVA tenderness. No cabrales.   Skin: No rashes  Vascular Access: peripheral IV    LABS:      139  |  97<L>  |  13  ----------------------------<  109<H>  4.0   |  28  |  0.9    Ca    9.0      2018 04:14  Mg     1.9         TPro  7.9  /  Alb  5.1  /  TBili  1.0  /  DBili      /  AST  24  /  ALT  29  /  AlkPhos  95      Creatinine Trend: 0.9 <--, 1.0 <--                        15.5   9.33  )-----------( 154      ( 2018 04:14 )             43.2     Urine Studies:  Urinalysis Basic - ( 2018 07:47 )    Color: Yellow / Appearance: Cloudy / S.010 / pH:   Gluc:  / Ketone: Negative  / Bili: Negative / Urobili: 1.0 mg/dL   Blood:  / Protein: Negative mg/dL / Nitrite: Negative   Leuk Esterase: Negative / RBC:  / WBC    Sq Epi:  / Non Sq Epi: Occasional /HPF / Bacteria:     VBG 18 15:35 PM  pH 7.34  CO2 60  O2 26  HCO3 33  Base excess, venus 4.5  O2 sat 44%    POCT Blood Glucose.: 134 mg/dL (2018 15:05)      RADIOLOGY & ADDITIONAL STUDIES:    EKG 19 7:46 AM  NSR, Rate 92 bpm    Transthoracic Echocardiogram 18 9:23 AM   1. Left ventricular ejection fraction, by visual estimation, is 60 to   65%.   2. Normal global left ventricular systolic function.   3. Spectral Doppler shows impaired relaxation pattern of left   ventricular myocardial filling (Grade I diastolic dysfunction).   4. Mild left ventricular hypertrophy.

## 2018-11-19 NOTE — CONSULT NOTE ADULT - ASSESSMENT
58 y/o male with no PMH, admitted and consulted for hypertensive emergency.    #HTN emergency  /145 at presentation to ED  Stroke protocol negative in ED  Denies recent stressor, but pain could be a factor in patient's HTN 56 y/o male with no PMHx, admitted and consulted to nephrology for hypertensive emergency with AMS    #HTN emergency  /145 at presentation to ED, currently 194/99 to LUE and 159/97 to RUE  Stroke protocol negative in ED  Electrolytes are Na 139, K 4, Cl 97 (low), HCO3 28, BUN 13, Cr 0.9, Gluc 109  Denies recent stressor, but pain could be a factor in patient's HTN, pt is also tachycardic at 100 bpm at 14:00, 116 bpm at admission  Coarctation is unlikely secondary to normal femoral pulses  CASSANDRA is unlikely secondary to lack of snoring, exhaustion, SOB  Cushing's syndrome and acromegaly are unlikely secondary to negative physical exam findings  - Urine metanephrines, MMA to r/o pheochromocytoma  - Renal artery duplex to r/o renal artery stenosis  - ESR, CRP to r/o vasculopathy  - TSH to r/o thyroid abnormality  - Uprotein/creatinine ratio to r/o nephrotic syndrome, CKD  - Renin/aldosterone ratio to r/o primary hyperaldosteronism  If labs are negative, consider essential hypertension vs stress/pain induced hypertension, and continue antihypertensive management with close follow-up    #Left lower molar tooth infection  Patient is taking amoxicillin, chlorhexidine  - F/u with dental

## 2018-11-19 NOTE — CONSULT NOTE ADULT - ATTENDING COMMENTS
I was present for and supervised the key/critical aspects of the procedures performed during the care of the patient.
PT seen and examined  case discussed w/ the student, above note reviewed and I agree with findings  Reports no PMH, follows w/ PMD no HTN  presented w/ tooth pain w/ incidental finding of /145  This is too high to be merely due to pain    would check PRA, [place], urine metanephrine (reports episodes of anxiety/palpitations) renal doppler, TSH, cortisol   - increase amlodpine to 10 mg,

## 2018-11-19 NOTE — CONSULT NOTE ADULT - ASSESSMENT
IMPRESSION:  Hypertensive urgency   Toothache crown fracture s/p dental eval   off nicardipine drip       PLAN:  resume PO antihypertensive   PO amoxicillin before discharge as per dental    pain control  DVT ppx SCDS   D/G to floor IMPRESSION:    Hypertensive urgency   Toothache crown fracture s/p dental eval   off nicardipine drip       PLAN:    start PO antihypertensive   PO amoxicillin as per dental  pain control  DVT ppx SCDS   echo  D/G to floor im pm

## 2018-11-19 NOTE — PROGRESS NOTE ADULT - SUBJECTIVE AND OBJECTIVE BOX
SUBJECTIVE:    Patient is a 57y old Male who presents with a chief complaint of Hypertensive emergency / AMS (2018 06:21)    Currently admitted to medicine with the primary diagnosis of Hypertensive urgency     Today is hospital day 1d.   OVERNIGHT EVENTS   Today, patient BP was went up to 170s systolic this morning, but pt asymptomatic     PAST MEDICAL & SURGICAL HISTORY  No pertinent past medical history  No significant past surgical history          ALLERGIES:  No Known Allergies    MEDICATIONS:  STANDING MEDICATIONS  amLODIPine   Tablet 5 milliGRAM(s) Oral daily  amoxicillin  875 milliGRAM(s)/clavulanate 1 Tablet(s) Oral two times a day  chlorhexidine 4% Liquid 1 Application(s) Topical <User Schedule>  enoxaparin Injectable 40 milliGRAM(s) SubCutaneous every 24 hours  niCARdipine Infusion 5 mG/Hr IV Continuous <Continuous>    PRN MEDICATIONS  oxyCODONE    5 mG/acetaminophen 325 mG 1 Tablet(s) Oral every 6 hours PRN    VITALS:   T(F): 98.2  HR: 102  BP: 173/109  RR: 21  SpO2: 98%          LABS:                        15.5   9.33  )-----------( 154      ( 2018 04:14 )             43.2         139  |  97<L>  |  13  ----------------------------<  109<H>  4.0   |  28  |  0.9    Ca    9.0      2018 04:14  Mg     1.9         TPro  7.9  /  Alb  5.1  /  TBili  1.0  /  DBili  x   /  AST  24  /  ALT  29  /  AlkPhos  95      PT/INR - ( 2018 15:18 )   PT: 12.20 sec;   INR: 1.06 ratio         PTT - ( 2018 15:18 )  PTT:33.2 sec  Urinalysis Basic - ( 2018 07:47 )    Color: Yellow / Appearance: Cloudy / S.010 / pH: x  Gluc: x / Ketone: Negative  / Bili: Negative / Urobili: 1.0 mg/dL   Blood: x / Protein: Negative mg/dL / Nitrite: Negative   Leuk Esterase: Negative / RBC: x / WBC x   Sq Epi: x / Non Sq Epi: Occasional /HPF / Bacteria: x        Troponin T, Serum: <0.01 ng/mL (18 @ 04:14)  Troponin T, Serum: <0.01 ng/mL (18 @ 15:18)      CARDIAC MARKERS ( 2018 04:14 )  x     / <0.01 ng/mL / x     / x     / x      CARDIAC MARKERS ( 2018 15:18 )  x     / <0.01 ng/mL / x     / x     / x          RADIOLOGY:    PHYSICAL EXAM:  GEN: NAD  LUNGS: CTAB  HEART: RRR s1s2 present  ABD: soft nontender distended   EXT: no edema  NEURO: aao x3

## 2018-11-19 NOTE — CONSULT NOTE ADULT - SUBJECTIVE AND OBJECTIVE BOX
Patient is a 57y old  Male who presents with a chief complaint of Hypertensive emergency / AMS (18 Nov 2018 20:52)      HPI:  56 y/o M no significant PMH presents to ED for chronic tooth pain s/p recent tooth capping found to have hypertensive emergency.  Pt had a capping of a left lower tooth about a month and a half ago and has been re-evaluated multiple times for continued pain. Today he presented to ED for pain once more but was found to have /145. Pt denies history of HTN and says he was last seen by PMD 3 months ago with normal BP. Pt's friend at bedside notes that this morning pt sounded "off" on the phone, he was a bit confused and slow to answer questions. Pt's friend notes that he is now improved but still not fully at baseline.  In ED pt sent for CTH stroke protocol which was negative and started on nicardipine gtt, pt then had 1 episode of watery emesis in ED    Pt endorses mildly improved tooth pain after dentistry intervention. He denies headache, dizziness, lightheadedness, Vision changes, N/V/F/C, SOB, cough, MOYA, chest pain, abdominal pain, diarrhea, constipation, dysuria, LE swelling, recent weight loss or sick contacts     ED: morphine 4mg IVP, nicardipine gtt (18 Nov 2018 20:52)      PAST MEDICAL & SURGICAL HISTORY:  No pertinent past medical history  No significant past surgical history    Allergies    No Known Allergies    Intolerances      FAMILY HISTORY:  No pertinent family history in first degree relatives    Home Medications:  Advil:  (18 Nov 2018 21:05)  glucosamine:  (18 Nov 2018 21:05)    Occupation:  Alochol: Denied  Smoking: Denied  Drug Use: Denied  Marital Status:         ROS: as in HPI; All other systems reviewed are negative    ICU Vital Signs Last 24 Hrs  T(C): 36.7 (19 Nov 2018 04:00), Max: 37 (18 Nov 2018 20:27)  T(F): 98 (19 Nov 2018 04:00), Max: 98.6 (18 Nov 2018 20:27)  HR: 92 (19 Nov 2018 05:00) (86 - 117)  BP: 153/96 (19 Nov 2018 05:00) (127/71 - 269/145)  BP(mean): 115 (19 Nov 2018 05:00) (89 - 115)  RR: 17 (19 Nov 2018 05:00) (12 - 22)  SpO2: 96% (19 Nov 2018 05:00) (96% - 99%)        Physical Examination:    General: No acute distress.  Alert, oriented, interactive, nonfocal    HEENT: Pupils equal, reactive to light.  Symmetric.    PULM: Clear to auscultation bilaterally, no significant sputum production    CVS: Regular rate and rhythm, no murmurs, rubs, or gallops    ABD: Soft, nondistended, nontender, normoactive bowel sounds, no masses    EXT: No edema, nontender    SKIN: Warm and well perfused, no rashes noted.              I&O's Detail    18 Nov 2018 07:01  -  19 Nov 2018 06:21  --------------------------------------------------------  IN:    niCARdipine Infusion: 40 mL  Total IN: 40 mL    OUT:    Voided: 1100 mL  Total OUT: 1100 mL    Total NET: -1060 mL            LABS:                        15.5   9.33  )-----------( 154      ( 19 Nov 2018 04:14 )             43.2     19 Nov 2018 04:14    139    |  97     |  13     ----------------------------<  109    4.0     |  28     |  0.9      Ca    9.0        19 Nov 2018 04:14  Mg     1.9       19 Nov 2018 04:14    TPro  7.9    /  Alb  5.1    /  TBili  1.0    /  DBili  x      /  AST  24     /  ALT  29     /  AlkPhos  95     18 Nov 2018 15:18  Amylase x     lipase x          CARDIAC MARKERS ( 19 Nov 2018 04:14 )  x     / <0.01 ng/mL / x     / x     / x      CARDIAC MARKERS ( 18 Nov 2018 15:18 )  x     / <0.01 ng/mL / x     / x     / x          CAPILLARY BLOOD GLUCOSE      POCT Blood Glucose.: 134 mg/dL (18 Nov 2018 15:05)    PT/INR - ( 18 Nov 2018 15:18 )   PT: 12.20 sec;   INR: 1.06 ratio         PTT - ( 18 Nov 2018 15:18 )  PTT:33.2 sec    Culture        MEDICATIONS  (STANDING):  chlorhexidine 4% Liquid 1 Application(s) Topical <User Schedule>  enoxaparin Injectable 40 milliGRAM(s) SubCutaneous every 24 hours  niCARdipine Infusion 5 mG/Hr (25 mL/Hr) IV Continuous <Continuous>    MEDICATIONS  (PRN):  morphine  - Injectable 4 milliGRAM(s) IV Push four times a day PRN Severe Pain (7 - 10)          RADIOLOGY: ***     CXR:  TLC:  OG:  ET tube:          ECHO: Patient is a 57y old  Male who presents with a chief complaint of AMS (18 Nov 2018 20:52)      HPI:  56 y/o M no significant PMH presents to ED for chronic tooth pain s/p recent tooth capping presented for AMS, tooth ache. Pt had a capping of a left lower tooth about a month and a half ago and has been re-evaluated multiple times for continued pain. Today he presented to ED for pain once more but was found to have /145. Pt denies history of HTN and says he was last seen by PMD 3 months ago with normal BP. Pt's friend at bedside notes that this morning pt sounded "off" on the phone, he was a bit confused and slow to answer questions. Pt's friend notes that he is now improved but still not fully at baseline.  In ED pt sent for CT stroke protocol which was negative and started on nicardipine gtt, pt then had 1 episode of watery emesis in ED    Pt endorses mildly improved tooth pain after dentistry intervention. He denies headache, dizziness, lightheadedness, Vision changes, N/V/F/C, SOB, cough, MOYA, chest pain, abdominal pain, diarrhea, constipation, dysuria, LE swelling, recent weight loss or sick contacts     ED: morphine 4mg IVP, nicardipine gtt (18 Nov 2018 20:52), off nicardipine since 2 00 am, s.p morphine, s/p dental eval      PAST MEDICAL & SURGICAL HISTORY:  No pertinent past medical history  No significant past surgical history    Allergies    No Known Allergies    Intolerances      FAMILY HISTORY:  No pertinent family history in first degree relatives    Home Medications:  Advil:  (18 Nov 2018 21:05)  glucosamine:  (18 Nov 2018 21:05)    Occupation:  Alochol: Denied  Smoking: Denied  Drug Use: Denied  Marital Status:         ROS: as in HPI; All other systems reviewed are negative    ICU Vital Signs Last 24 Hrs  T(C): 36.7 (19 Nov 2018 04:00), Max: 37 (18 Nov 2018 20:27)  T(F): 98 (19 Nov 2018 04:00), Max: 98.6 (18 Nov 2018 20:27)  HR: 92 (19 Nov 2018 05:00) (86 - 117)  BP: 153/96 (19 Nov 2018 05:00) (127/71 - 269/145)  BP(mean): 115 (19 Nov 2018 05:00) (89 - 115)  RR: 17 (19 Nov 2018 05:00) (12 - 22)  SpO2: 96% (19 Nov 2018 05:00) (96% - 99%)        Physical Examination:    General: axox3    HEENT: Pupils equal, reactive to light.  Symmetric.    PULM: good air entry    CVS: Regular rate and rhythm, no murmurs, rubs, or gallops    ABD: Soft, nondistended, nontender, normoactive bowel sounds, no masses    EXT: No edema, nontender    SKIN: Warm and well perfused, no rashes noted.              I&O's Detail    18 Nov 2018 07:01  -  19 Nov 2018 06:21  --------------------------------------------------------  IN:    niCARdipine Infusion: 40 mL  Total IN: 40 mL    OUT:    Voided: 1100 mL  Total OUT: 1100 mL    Total NET: -1060 mL            LABS:                        15.5   9.33  )-----------( 154      ( 19 Nov 2018 04:14 )             43.2     19 Nov 2018 04:14    139    |  97     |  13     ----------------------------<  109    4.0     |  28     |  0.9      Ca    9.0        19 Nov 2018 04:14  Mg     1.9       19 Nov 2018 04:14    TPro  7.9    /  Alb  5.1    /  TBili  1.0    /  DBili  x      /  AST  24     /  ALT  29     /  AlkPhos  95     18 Nov 2018 15:18  Amylase x     lipase x          CARDIAC MARKERS ( 19 Nov 2018 04:14 )  x     / <0.01 ng/mL / x     / x     / x      CARDIAC MARKERS ( 18 Nov 2018 15:18 )  x     / <0.01 ng/mL / x     / x     / x          CAPILLARY BLOOD GLUCOSE      POCT Blood Glucose.: 134 mg/dL (18 Nov 2018 15:05)    PT/INR - ( 18 Nov 2018 15:18 )   PT: 12.20 sec;   INR: 1.06 ratio         PTT - ( 18 Nov 2018 15:18 )  PTT:33.2 sec    Culture        MEDICATIONS  (STANDING):  chlorhexidine 4% Liquid 1 Application(s) Topical <User Schedule>  enoxaparin Injectable 40 milliGRAM(s) SubCutaneous every 24 hours  niCARdipine Infusion 5 mG/Hr (25 mL/Hr) IV Continuous <Continuous>    MEDICATIONS  (PRN):  morphine  - Injectable 4 milliGRAM(s) IV Push four times a day PRN Severe Pain (7 - 10)

## 2018-11-20 ENCOUNTER — TRANSCRIPTION ENCOUNTER (OUTPATIENT)
Age: 58
End: 2018-11-20

## 2018-11-20 LAB
ALBUMIN SERPL ELPH-MCNC: 4.5 G/DL — SIGNIFICANT CHANGE UP (ref 3.5–5.2)
ALP SERPL-CCNC: 73 U/L — SIGNIFICANT CHANGE UP (ref 30–115)
ALT FLD-CCNC: 23 U/L — SIGNIFICANT CHANGE UP (ref 0–41)
ANION GAP SERPL CALC-SCNC: 15 MMOL/L — HIGH (ref 7–14)
AST SERPL-CCNC: 21 U/L — SIGNIFICANT CHANGE UP (ref 0–41)
BASOPHILS # BLD AUTO: 0.04 K/UL — SIGNIFICANT CHANGE UP (ref 0–0.2)
BASOPHILS NFR BLD AUTO: 0.4 % — SIGNIFICANT CHANGE UP (ref 0–1)
BILIRUB SERPL-MCNC: 1.3 MG/DL — HIGH (ref 0.2–1.2)
BUN SERPL-MCNC: 14 MG/DL — SIGNIFICANT CHANGE UP (ref 10–20)
CALCIUM SERPL-MCNC: 9.2 MG/DL — SIGNIFICANT CHANGE UP (ref 8.5–10.1)
CHLORIDE SERPL-SCNC: 98 MMOL/L — SIGNIFICANT CHANGE UP (ref 98–110)
CO2 SERPL-SCNC: 26 MMOL/L — SIGNIFICANT CHANGE UP (ref 17–32)
CREAT SERPL-MCNC: 1 MG/DL — SIGNIFICANT CHANGE UP (ref 0.7–1.5)
EOSINOPHIL # BLD AUTO: 0.06 K/UL — SIGNIFICANT CHANGE UP (ref 0–0.7)
EOSINOPHIL NFR BLD AUTO: 0.6 % — SIGNIFICANT CHANGE UP (ref 0–8)
GLUCOSE SERPL-MCNC: 96 MG/DL — SIGNIFICANT CHANGE UP (ref 70–99)
HCT VFR BLD CALC: 47.5 % — SIGNIFICANT CHANGE UP (ref 42–52)
HGB BLD-MCNC: 16.4 G/DL — SIGNIFICANT CHANGE UP (ref 14–18)
IMM GRANULOCYTES NFR BLD AUTO: 0.3 % — SIGNIFICANT CHANGE UP (ref 0.1–0.3)
LYMPHOCYTES # BLD AUTO: 1 K/UL — LOW (ref 1.2–3.4)
LYMPHOCYTES # BLD AUTO: 9.8 % — LOW (ref 20.5–51.1)
MAGNESIUM SERPL-MCNC: 2.1 MG/DL — SIGNIFICANT CHANGE UP (ref 1.8–2.4)
MCHC RBC-ENTMCNC: 31.6 PG — HIGH (ref 27–31)
MCHC RBC-ENTMCNC: 34.5 G/DL — SIGNIFICANT CHANGE UP (ref 32–37)
MCV RBC AUTO: 91.5 FL — SIGNIFICANT CHANGE UP (ref 80–94)
MONOCYTES # BLD AUTO: 0.93 K/UL — HIGH (ref 0.1–0.6)
MONOCYTES NFR BLD AUTO: 9.1 % — SIGNIFICANT CHANGE UP (ref 1.7–9.3)
NEUTROPHILS # BLD AUTO: 8.14 K/UL — HIGH (ref 1.4–6.5)
NEUTROPHILS NFR BLD AUTO: 79.8 % — HIGH (ref 42.2–75.2)
NRBC # BLD: 0 /100 WBCS — SIGNIFICANT CHANGE UP (ref 0–0)
PLATELET # BLD AUTO: 152 K/UL — SIGNIFICANT CHANGE UP (ref 130–400)
POTASSIUM SERPL-MCNC: 3.8 MMOL/L — SIGNIFICANT CHANGE UP (ref 3.5–5)
POTASSIUM SERPL-SCNC: 3.8 MMOL/L — SIGNIFICANT CHANGE UP (ref 3.5–5)
PROT SERPL-MCNC: 6.8 G/DL — SIGNIFICANT CHANGE UP (ref 6–8)
RBC # BLD: 5.19 M/UL — SIGNIFICANT CHANGE UP (ref 4.7–6.1)
RBC # FLD: 11.8 % — SIGNIFICANT CHANGE UP (ref 11.5–14.5)
SODIUM SERPL-SCNC: 139 MMOL/L — SIGNIFICANT CHANGE UP (ref 135–146)
WBC # BLD: 10.2 K/UL — SIGNIFICANT CHANGE UP (ref 4.8–10.8)
WBC # FLD AUTO: 10.2 K/UL — SIGNIFICANT CHANGE UP (ref 4.8–10.8)

## 2018-11-20 RX ORDER — IBUPROFEN 200 MG
1 TABLET ORAL
Qty: 0 | Refills: 0 | COMMUNITY
Start: 2018-11-20

## 2018-11-20 RX ORDER — AMOXICILLIN 250 MG/5ML
1 SUSPENSION, RECONSTITUTED, ORAL (ML) ORAL
Qty: 20 | Refills: 0
Start: 2018-11-20 | End: 2018-11-29

## 2018-11-20 RX ORDER — CHLORTHALIDONE 50 MG
0.5 TABLET ORAL
Qty: 15 | Refills: 0
Start: 2018-11-20 | End: 2018-12-19

## 2018-11-20 RX ORDER — HYDROCHLOROTHIAZIDE 25 MG
12.5 TABLET ORAL ONCE
Qty: 0 | Refills: 0 | Status: COMPLETED | OUTPATIENT
Start: 2018-11-20 | End: 2018-11-20

## 2018-11-20 RX ORDER — AMLODIPINE BESYLATE 2.5 MG/1
5 TABLET ORAL ONCE
Qty: 0 | Refills: 0 | Status: COMPLETED | OUTPATIENT
Start: 2018-11-20 | End: 2018-11-20

## 2018-11-20 RX ORDER — LISINOPRIL 2.5 MG/1
10 TABLET ORAL DAILY
Qty: 0 | Refills: 0 | Status: DISCONTINUED | OUTPATIENT
Start: 2018-11-20 | End: 2018-11-20

## 2018-11-20 RX ORDER — IBUPROFEN 200 MG
0 TABLET ORAL
Qty: 0 | Refills: 0 | COMMUNITY

## 2018-11-20 RX ORDER — HYDROCHLOROTHIAZIDE 25 MG
12.5 TABLET ORAL DAILY
Qty: 0 | Refills: 0 | Status: DISCONTINUED | OUTPATIENT
Start: 2018-11-20 | End: 2018-11-21

## 2018-11-20 RX ORDER — AMLODIPINE BESYLATE 2.5 MG/1
10 TABLET ORAL DAILY
Qty: 0 | Refills: 0 | Status: DISCONTINUED | OUTPATIENT
Start: 2018-11-20 | End: 2018-11-20

## 2018-11-20 RX ORDER — HYDRALAZINE HCL 50 MG
25 TABLET ORAL ONCE
Qty: 0 | Refills: 0 | Status: DISCONTINUED | OUTPATIENT
Start: 2018-11-20 | End: 2018-11-20

## 2018-11-20 RX ORDER — IBUPROFEN 200 MG
1 TABLET ORAL
Qty: 80 | Refills: 0
Start: 2018-11-20 | End: 2018-12-09

## 2018-11-20 RX ORDER — IBUPROFEN 200 MG
600 TABLET ORAL EVERY 6 HOURS
Qty: 0 | Refills: 0 | Status: DISCONTINUED | OUTPATIENT
Start: 2018-11-20 | End: 2018-11-21

## 2018-11-20 RX ORDER — HYDROMORPHONE HYDROCHLORIDE 2 MG/ML
0.5 INJECTION INTRAMUSCULAR; INTRAVENOUS; SUBCUTANEOUS EVERY 4 HOURS
Qty: 0 | Refills: 0 | Status: DISCONTINUED | OUTPATIENT
Start: 2018-11-20 | End: 2018-11-21

## 2018-11-20 RX ORDER — NIFEDIPINE 30 MG
1 TABLET, EXTENDED RELEASE 24 HR ORAL
Qty: 30 | Refills: 0
Start: 2018-11-20 | End: 2018-12-19

## 2018-11-20 RX ORDER — NIFEDIPINE 30 MG
30 TABLET, EXTENDED RELEASE 24 HR ORAL AT BEDTIME
Qty: 0 | Refills: 0 | Status: DISCONTINUED | OUTPATIENT
Start: 2018-11-20 | End: 2018-11-21

## 2018-11-20 RX ORDER — HYDRALAZINE HCL 50 MG
10 TABLET ORAL ONCE
Qty: 0 | Refills: 0 | Status: COMPLETED | OUTPATIENT
Start: 2018-11-20 | End: 2018-11-20

## 2018-11-20 RX ADMIN — OXYCODONE AND ACETAMINOPHEN 1 TABLET(S): 5; 325 TABLET ORAL at 23:19

## 2018-11-20 RX ADMIN — OXYCODONE AND ACETAMINOPHEN 1 TABLET(S): 5; 325 TABLET ORAL at 23:49

## 2018-11-20 RX ADMIN — OXYCODONE AND ACETAMINOPHEN 1 TABLET(S): 5; 325 TABLET ORAL at 12:09

## 2018-11-20 RX ADMIN — Medication 12.5 MILLIGRAM(S): at 12:21

## 2018-11-20 RX ADMIN — OXYCODONE AND ACETAMINOPHEN 1 TABLET(S): 5; 325 TABLET ORAL at 11:13

## 2018-11-20 RX ADMIN — AMLODIPINE BESYLATE 5 MILLIGRAM(S): 2.5 TABLET ORAL at 09:52

## 2018-11-20 RX ADMIN — Medication 1 TABLET(S): at 17:23

## 2018-11-20 RX ADMIN — OXYCODONE AND ACETAMINOPHEN 1 TABLET(S): 5; 325 TABLET ORAL at 05:23

## 2018-11-20 RX ADMIN — OXYCODONE AND ACETAMINOPHEN 1 TABLET(S): 5; 325 TABLET ORAL at 05:53

## 2018-11-20 RX ADMIN — Medication 30 MILLIGRAM(S): at 21:09

## 2018-11-20 RX ADMIN — ENOXAPARIN SODIUM 40 MILLIGRAM(S): 100 INJECTION SUBCUTANEOUS at 05:19

## 2018-11-20 RX ADMIN — Medication 1 TABLET(S): at 05:19

## 2018-11-20 RX ADMIN — Medication 10 MILLIGRAM(S): at 17:24

## 2018-11-20 RX ADMIN — AMLODIPINE BESYLATE 5 MILLIGRAM(S): 2.5 TABLET ORAL at 05:19

## 2018-11-20 RX ADMIN — CHLORHEXIDINE GLUCONATE 1 APPLICATION(S): 213 SOLUTION TOPICAL at 05:19

## 2018-11-20 RX ADMIN — OXYCODONE AND ACETAMINOPHEN 1 TABLET(S): 5; 325 TABLET ORAL at 17:04

## 2018-11-20 NOTE — DISCHARGE NOTE ADULT - HOSPITAL COURSE
58 y/o M no significant PMH presents to ED for chronic tooth pain s/p recent tooth capping found to have hypertensive emergency.  Pt had a capping of a left lower tooth about a month and a half ago and has been re-evaluated multiple times for continued pain. He presented to ED for pain once more but was found to have /145. Pt denies history of HTN and says he was last seen by PMD 3 months ago with normal BP. Pt's friend at bedside notes that this morning pt sounded "off" on the phone, he was a bit confused and slow to answer questions. Pt's friend notes that he is now improved but still not fully at baseline.  In ED pt sent for CTH stroke protocol which was negative and started on nicardipine gtt, pt then had 1 episode of watery emesis in ED. Pt endorsed mildly improved tooth pain after dentistry intervention. In the ED, morphine 4mg IVP, nicardipine gtt was started and pt was upgraded to CCU. The pt's BP improved and the gtt was d/c'd. The pt was subsequently downgraded to the medical floor. Several tests were done to determine cause of secondary HTN. Renal doppler showed mild/moderate stenosis of L renal artery. Labs to r/o pheochromocytoma, hyperaldo/inc renin, hyperthyroid, etc were ordered. Renal was following in patient and added BP meds for pt. His BP ranged 163//112 during his in-patient stay. During his stay, tooth # 18 displayed open margin on crown with symptomatic apical periodontitis. This tooth was extracted by dental and the pt was d/c'd home on a 10 day course of amoxicillin and ibuprofen for pain control. Procardia and chlorthalidone were ordered upon d/c.

## 2018-11-20 NOTE — PROGRESS NOTE ADULT - SUBJECTIVE AND OBJECTIVE BOX
Nephrology progress note    Patient is seen and examined, events over the last 24 h noted.    Patient slept well overnight, no complaints at this time, is asking to return home. Reports mild left lower tooth pain. Denies f/c, n/v/d/c, HA, lightheadedness, swelling, confusion.    Allergies:  No Known Allergies    Hospital Medications:   MEDICATIONS  (STANDING):  amLODIPine   Tablet 10 milliGRAM(s) Oral daily  amoxicillin  875 milliGRAM(s)/clavulanate 1 Tablet(s) Oral two times a day  chlorhexidine 4% Liquid 1 Application(s) Topical <User Schedule>  enoxaparin Injectable 40 milliGRAM(s) SubCutaneous every 24 hours        VITALS:  T(F): 97.5 (18 @ 05:49), Max: 98.1 (18 @ 18:00)  HR: 93 (18 @ 05:49)  BP: 163/98 (18 @ 07:00)  RR: 18 (18 21:56)  SpO2: 100% (18 @ 22:30)  Wt(kg): --     @ 07:01  -   07:00  --------------------------------------------------------  IN: 40 mL / OUT: 1100 mL / NET: -1060 mL    :01  -   07:00  --------------------------------------------------------  IN: 360 mL / OUT: 700 mL / NET: -340 mL      Height (cm): 165.1 (:56)  Weight (kg): 68.1 (:56)  BMI (kg/m2): 25 (:56)  BSA (m2): 1.75 (:)    PHYSICAL EXAM:  Constitutional: NAD, comfortable, asking to go home  HEENT: anicteric sclera, oropharynx clear, MMM, mild swelling to left lower molar area with tenderness  Neck: No JVD, mild swelling to submandibular LN to the L, no tenderness noted  Respiratory: CTAB, no wheezes, rales or rhonchi  Cardiovascular: S1, S2, RRR  Gastrointestinal: BS+, soft, NT/ND  Extremities: No cyanosis or clubbing. No peripheral edema  :  No cabrales   Skin: No rashes    LABS:      139  |  98  |  14  ----------------------------<  96  3.8   |  26  |  1.0    Ca    9.2      2018 06:57  Mg     2.1         TPro  6.8  /  Alb  4.5  /  TBili  1.3<H>  /  DBili      /  AST  21  /  ALT  23  /  AlkPhos  73                            16.4   10.20 )-----------( 152      ( 2018 06:57 )             47.5       Urine Studies:  Urinalysis Basic - ( 2018 07:47 )    Color: Yellow / Appearance: Cloudy / S.010 / pH:   Gluc:  / Ketone: Negative  / Bili: Negative / Urobili: 1.0 mg/dL   Blood:  / Protein: Negative mg/dL / Nitrite: Negative   Leuk Esterase: Negative / RBC:  / WBC    Sq Epi:  / Non Sq Epi: Occasional /HPF / Bacteria:       Creatinine, Random Urine: 58 mg/dL ( @ 07:47)  Protein/Creatinine Ratio Calculation: 0.1 Ratio ( @ 07:47)    RADIOLOGY & ADDITIONAL STUDIES: Nephrology progress note    Patient is seen and examined, events over the last 24 h noted.    Patient slept well overnight, no complaints at this time, is asking to return home. Reports mild left lower tooth pain. Denies f/c, n/v/d/c, HA, lightheadedness, swelling, confusion.    Allergies:  No Known Allergies    Hospital Medications:   MEDICATIONS  (STANDING):  amLODIPine   Tablet 10 milliGRAM(s) Oral daily  amoxicillin  875 milliGRAM(s)/clavulanate 1 Tablet(s) Oral two times a day  chlorhexidine 4% Liquid 1 Application(s) Topical <User Schedule>  enoxaparin Injectable 40 milliGRAM(s) SubCutaneous every 24 hours        VITALS: (Last 24 hours)    T(C): 36.4 (2018 05:49), Max: 36.7 (2018 18:00)  T(F): 97.5 (2018 05:49), Max: 98.1 (2018 18:00)  HR: 93 (2018 05:49) (78 - 104)  BP: 163/98 (2018 07:00) (136/76 - 194/99)  BP(mean): 121 (2018 20:00) (121 - 140)  RR: 18 (2018 21:56) (12 - 37)  SpO2: 100% (2018 22:30) (96% - 100%)       @ :  -   @ 07:00  --------------------------------------------------------  IN: 40 mL / OUT: 1100 mL / NET: -1060 mL     @ 07:  -   @ 07:00  --------------------------------------------------------  IN: 360 mL / OUT: 700 mL / NET: -340 mL      Height (cm): 165.1 ( @ 21:56)  Weight (kg): 68.1 ( @ 21:56)  BMI (kg/m2): 25 ( @ 21:56)  BSA (m2): 1.75 ( @ 21:56)    PHYSICAL EXAM:  Constitutional: NAD, comfortable, asking to go home  HEENT: anicteric sclera, oropharynx clear, MMM, mild swelling to left lower molar area with tenderness  Neck: No JVD, mild swelling to submandibular LN to the L, no tenderness noted  Respiratory: CTAB, no wheezes, rales or rhonchi  Cardiovascular: S1, S2, RRR  Gastrointestinal: BS+, soft, NT/ND  Extremities: No cyanosis or clubbing. No peripheral edema  :  No cabrales   Skin: No rashes    LABS:      139  |  98  |  14  ----------------------------<  96  3.8   |  26  |  1.0    Ca    9.2      2018 06:57  Mg     2.1         TPro  6.8  /  Alb  4.5  /  TBili  1.3<H>  /  DBili      /  AST  21  /  ALT  23  /  AlkPhos  73                                16.4   10.20 )-----------( 152      ( 2018 06:57 )             47.5       Urine Studies:  Urinalysis Basic - ( 2018 07:47 )    Color: Yellow / Appearance: Cloudy / S.010 / pH:   Gluc:  / Ketone: Negative  / Bili: Negative / Urobili: 1.0 mg/dL   Blood:  / Protein: Negative mg/dL / Nitrite: Negative   Leuk Esterase: Negative / RBC:  / WBC    Sq Epi:  / Non Sq Epi: Occasional /HPF / Bacteria:       Creatinine, Random Urine: 58 mg/dL ( @ 07:47)  Protein/Creatinine Ratio Calculation: 0.1 Ratio ( @ 07:47)    RADIOLOGY & ADDITIONAL STUDIES: Nephrology progress note    Patient is seen and examined, events over the last 24 h noted.    Patient slept well overnight, no complaints at this time, is asking to return home. Reports mild left lower tooth pain. Denies f/c, n/v/d/c, HA, lightheadedness, swelling, confusion.    Allergies:  No Known Allergies    Hospital Medications:   MEDICATIONS  (STANDING):  amLODIPine   Tablet 10 milliGRAM(s) Oral daily  amoxicillin  875 milliGRAM(s)/clavulanate 1 Tablet(s) Oral two times a day  chlorhexidine 4% Liquid 1 Application(s) Topical <User Schedule>  enoxaparin Injectable 40 milliGRAM(s) SubCutaneous every 24 hours        VITALS: (Last 24 hours)    T(C): 36.4 (2018 05:49), Max: 36.7 (2018 18:00)  T(F): 97.5 (2018 05:49), Max: 98.1 (2018 18:00)  HR: 93 (2018 05:49) (78 - 104)  BP: 163/98 (2018 07:00) (136/76 - 194/99)  BP(mean): 121 (2018 20:00) (121 - 140)  RR: 18 (2018 21:56) (12 - 37)  SpO2: 100% (2018 22:30) (96% - 100%)       @ :  -   @ 07:00  --------------------------------------------------------  IN: 40 mL / OUT: 1100 mL / NET: -1060 mL     @ 07:  -   @ 07:00  --------------------------------------------------------  IN: 360 mL / OUT: 700 mL / NET: -340 mL      Height (cm): 165.1 ( @ 21:56)  Weight (kg): 68.1 ( @ 21:56)  BMI (kg/m2): 25 ( @ 21:56)  BSA (m2): 1.75 ( @ 21:56)    PHYSICAL EXAM:  Constitutional: NAD, comfortable, asking to go home  HEENT: anicteric sclera, oropharynx clear, MMM, mild swelling to left lower molar area with tenderness  Neck: No JVD, mild swelling to submandibular LN to the L, no tenderness noted  Respiratory: CTAB, no wheezes, rales or rhonchi  Cardiovascular: S1, S2, RRR  Gastrointestinal: BS+, soft, NT/ND  Extremities: No cyanosis or clubbing. No peripheral edema  :  No cabrales   Skin: No rashes    LABS:      139  |  98  |  14  ----------------------------<  96  3.8   |  26  |  1.0    Ca    9.2      2018 06:57  Mg     2.1         TPro  6.8  /  Alb  4.5  /  TBili  1.3<H>  /  DBili      /  AST  21  /  ALT  23  /  AlkPhos  73      TSH Serum: 1.7                          16.4   10. )-----------( 152      ( 2018 06:57 )             47.5       Urine Studies:  Urinalysis Basic - ( 2018 07:47 )    Color: Yellow / Appearance: Cloudy / S.010 / pH:   Gluc:  / Ketone: Negative  / Bili: Negative / Urobili: 1.0 mg/dL   Blood:  / Protein: Negative mg/dL / Nitrite: Negative   Leuk Esterase: Negative / RBC:  / WBC    Sq Epi:  / Non Sq Epi: Occasional /HPF / Bacteria:       Creatinine, Random Urine: 58 mg/dL ( @ 07:47)  Protein/Creatinine Ratio Calculation: 0.1 Ratio ( @ 07:47)        RADIOLOGY & ADDITIONAL STUDIES:    VA Duplex Pelvic Vasculature:       Mild/moderate stenosis of L renal artery. No appreciated stenosis of R renal artery Nephrology progress note    Patient is seen and examined, events over the last 24 h noted. No events overnight per RN.    Patient slept well overnight, no complaints at this time, is asking to return home. Reports mild left lower tooth pain. Denies f/c, n/v/d/c, HA, lightheadedness, swelling, confusion.    Allergies:  No Known Allergies    Hospital Medications:   MEDICATIONS  (STANDING):  amLODIPine   Tablet 10 milliGRAM(s) Oral daily  amoxicillin  875 milliGRAM(s)/clavulanate 1 Tablet(s) Oral two times a day  chlorhexidine 4% Liquid 1 Application(s) Topical <User Schedule>  enoxaparin Injectable 40 milliGRAM(s) SubCutaneous every 24 hours      VITALS: (Last 24 hours)    T(C): 36.4 (2018 05:49), Max: 36.7 (2018 18:00)  T(F): 97.5 (2018 05:49), Max: 98.1 (2018 18:00)  HR: 93 (2018 05:49) (78 - 104)  BP: 163/98 (2018 07:00) (136/76 - 194/99)  BP(mean): 121 (2018 20:00) (121 - 140)  RR: 18 (2018 21:56) (12 - 37)  SpO2: 100% (2018 22:30) (96% - 100%)       @ 07:  -   @ 07:00  --------------------------------------------------------  IN: 40 mL / OUT: 1100 mL / NET: -1060 mL     @ 07:  -   @ 07:00  --------------------------------------------------------  IN: 360 mL / OUT: 700 mL / NET: -340 mL      Height (cm): 165.1 ( @ 21:56)  Weight (kg): 68.1 ( @ 21:56)  BMI (kg/m2): 25 ( @ 21:56)  BSA (m2): 1.75 ( @ 21:56)    PHYSICAL EXAM:  Constitutional: NAD, comfortable, asking to go home  HEENT: anicteric sclera, oropharynx clear, MMM, mild swelling to left lower molar area with tenderness  Neck: No JVD, mild swelling to submandibular LN to the L, no tenderness noted  Respiratory: CTAB, no wheezes, rales or rhonchi  Cardiovascular: S1, S2, RRR  Gastrointestinal: BS+, soft, NT/ND  Extremities: No cyanosis or clubbing. No peripheral edema  :  No cabrales   Skin: No rashes    LABS:      139  |  98  |  14  ----------------------------<  96  3.8   |  26  |  1.0    Ca    9.2      2018 06:57  Mg     2.1         TPro  6.8  /  Alb  4.5  /  TBili  1.3<H>  /  DBili      /  AST  21  /  ALT  23  /  AlkPhos  73      TSH Serum: 1.7                          16.4   10. )-----------( 152      ( 2018 06:57 )             47.5       Urine Studies:  Urinalysis Basic - ( 2018 07:47 )    Color: Yellow / Appearance: Cloudy / S.010 / pH:   Gluc:  / Ketone: Negative  / Bili: Negative / Urobili: 1.0 mg/dL   Blood:  / Protein: Negative mg/dL / Nitrite: Negative   Leuk Esterase: Negative / RBC:  / WBC    Sq Epi:  / Non Sq Epi: Occasional /HPF / Bacteria:       Creatinine, Random Urine: 58 mg/dL ( @ 07:47)  Protein/Creatinine Ratio Calculation: 0.1 Ratio ( @ 07:47)        RADIOLOGY & ADDITIONAL STUDIES:    VA Duplex Pelvic Vasculature:   Mild/moderate stenosis of L renal artery. No appreciated stenosis of R renal artery

## 2018-11-20 NOTE — PROGRESS NOTE ADULT - ASSESSMENT
a/p:  #hypertensive emergency  -initially admitted to ICU for monitoring and treatment with nicardipine gtt  -transitioned to po antihypertensive and now on regular med fllow  -cont norvasc 10 mg daily---will add lisinopril 10 mg daily as second agent for better bp control  -tsh normal  -no protein in urine  -appreciate nephrology recommendations---awaiting results of cortisol/aldosterone/urine metanephrine   -prelim renal US with mild-mod Left RA stenosis0---no right sided stenosis  -ECHO- normal EF, LVH, mild grade 1 diastolic dysfunction  -cont to monitor bp and hr    #Dental pain/infection  -cont with oral amoxicillin  -f/u with dental   -pain control    #DVT/GI ppx    anticipate likely d/c home in next 24-48 hrs once bp stable and additional pending results back   patient states pcp is Dr. Lind (? spelling)---if unable to identify pcp please schedule f/u appt at [facility]    FULL CODE

## 2018-11-20 NOTE — PROGRESS NOTE ADULT - SUBJECTIVE AND OBJECTIVE BOX
Patient is a 57y old  Male who presents with a chief complaint of Hypertensive emergency / AMS (2018 11:54)      HPI:  56 y/o M no significant PMH presents to ED for chronic tooth pain s/p recent tooth capping found to have hypertensive emergency.  Pt had a capping of a left lower tooth about a month and a half ago and has been re-evaluated multiple times for continued pain. Today he presented to ED for pain once more but was found to have /145. Pt denies history of HTN and says he was last seen by PMD 3 months ago with normal BP. Pt's friend at bedside notes that this morning pt sounded "off" on the phone, he was a bit confused and slow to answer questions. Pt's friend notes that he is now improved but still not fully at baseline.  In ED pt sent for CT stroke protocol which was negative and started on nicardipine gtt, pt then had 1 episode of watery emesis in ED    Pt endorses mildly improved tooth pain after dentistry intervention. He denies headache, dizziness, lightheadedness, Vision changes, N/V/F/C, SOB, cough, MOYA, chest pain, abdominal pain, diarrhea, constipation, dysuria, LE swelling, recent weight loss or sick contacts     ED: morphine 4mg IVP, nicardipine gtt (2018 20:52)      PAST MEDICAL & SURGICAL HISTORY:  No pertinent past medical history  No significant past surgical history    (   ) heart valve replacement  (   ) joint replacement  (   ) pregnancy    MEDICATIONS  (STANDING):  amoxicillin  875 milliGRAM(s)/clavulanate 1 Tablet(s) Oral two times a day  chlorhexidine 4% Liquid 1 Application(s) Topical <User Schedule>  enoxaparin Injectable 40 milliGRAM(s) SubCutaneous every 24 hours  hydrochlorothiazide 12.5 milliGRAM(s) Oral daily  lisinopril 10 milliGRAM(s) Oral daily  NIFEdipine XL 30 milliGRAM(s) Oral at bedtime    MEDICATIONS  (PRN):  HYDROmorphone  Injectable 0.5 milliGRAM(s) IV Push every 4 hours PRN Severe Pain (7 - 10)  ibuprofen  Tablet. 600 milliGRAM(s) Oral every 6 hours PRN Mild Pain (1 - 3)  oxyCODONE    5 mG/acetaminophen 325 mG 1 Tablet(s) Oral every 6 hours PRN Moderate Pain (4 - 6)      REVIEW OF SYSTEMS      General:	    Skin/Breast:  	  Ophthalmologic:  	  ENMT:	    Respiratory and Thorax:  	  Cardiovascular:	    Gastrointestinal:	    Genitourinary:	    Musculoskeletal:	    Neurological:	    Psychiatric:	    Hematology/Lymphatics:	    Endocrine:	    Allergic/Immunologic:	    Allergies    No Known Allergies    Intolerances        FAMILY HISTORY:  No pertinent family history in first degree relatives      *SOCIAL HISTORY: (   ) Tobacco; (   ) ETOH    Vital Signs Last 24 Hrs  T(C): 36.4 (2018 05:49), Max: 36.7 (2018 18:00)  T(F): 97.5 (2018 05:49), Max: 98.1 (2018 18:00)  HR: 87 (2018 12:00) (86 - 104)  BP: 166/98 (2018 12:00) (136/76 - 184/96)  BP(mean): 126 (2018 12:00) (121 - 134)  RR: 17 (2018 12:00) (17 - 22)  SpO2: 100% (2018 22:30) (98% - 100%)    LABS:                        16.4   10.20 )-----------( 152      ( 2018 06:57 )             47.5         139  |  98  |  14  ----------------------------<  96  3.8   |  26  |  1.0    Ca    9.2      2018 06:57  Mg     2.1         TPro  6.8  /  Alb  4.5  /  TBili  1.3<H>  /  DBili  x   /  AST  21  /  ALT  23  /  AlkPhos  73      Platelet Count - Automated: 152 K/uL [130 - 400] ( @ 06:57)  WBC Count: 10.20 K/uL [4.80 - 10.80] ( @ 06:57)    Urinalysis Basic - ( 2018 07:47 )    Color: Yellow / Appearance: Cloudy / S.010 / pH: x  Gluc: x / Ketone: Negative  / Bili: Negative / Urobili: 1.0 mg/dL   Blood: x / Protein: Negative mg/dL / Nitrite: Negative   Leuk Esterase: Negative / RBC: x / WBC x   Sq Epi: x / Non Sq Epi: Occasional /HPF / Bacteria: x      PT/INR - ( 2018 15:18 )   PT: 12.20 sec;   INR: 1.06 ratio         PTT - ( 2018 15:18 )  PTT:33.2 sec    Last Dental Visit: << Patient reports last dental appointment was the week previously >>    EOE:  TMJ (   ) clicks                     (   ) pops                     (   ) crepitus             Mandible <<FROM>>             Facial bones and MOM <<grossly intact>>             (  x ) trismus             (   ) lymphadenopathy             (   ) swelling             (   ) asymmetry             (   ) palpation             (   ) dyspnea             (   ) dysphagia             (   ) loss of consciousness    IOE:  multiple missing teeth, periodontitis                hard/soft palate:  (   ) palatal torus, <<No pathology noted>>            tongue/FOM <<No pathology noted>>            labial/buccal mucosa <<No pathology noted>>           (  x ) percussion           (   ) palpation           (   ) swelling            (   ) abscess           (   ) sinus tract    *DENTAL RADIOGRAPHS: 1 periapical exposed #18    RADIOLOGY & ADDITIONAL STUDIES: none    *ASSESSMENT: #18 displays open margin on crown with symptomatic apical periodontitis    *PLAN: Extract #18    PROCEDURE:   Verbal and written consent given.  Anesthesia: << 1 carpule 2% lidocaine with 1:100,000 epinephrine and 2 carpules 3% mepivicaine with no epinephrine   >>   Treatment: << Treatment consequences discussed as sheet dated 00. Side site obtained. Local anesthesia administered. #18 removed without complications. Curettage and irrigation used. Hemostasis achieved with gauze. Post op administered.  Blood pressure is 250/80 >>     RECOMMENDATIONS:  1) << Amoxicillin 500 mg, 21 tabs and Ibuprofen 600 mg, 10 tabs  >>  2) Dental F/U with outpatient dentist for comprehensive dental care.   3) If any difficulty swallowing/breathing, fever occur, return to ER.     Resident Name, pager # [phone] Patient is a 57y old  Male who presents with a chief complaint of Hypertensive emergency / AMS (2018 11:54)      HPI:  58 y/o M no significant PMH presents to ED for chronic tooth pain s/p recent tooth capping found to have hypertensive emergency.  Pt had a capping of a left lower tooth about a month and a half ago and has been re-evaluated multiple times for continued pain. Today he presented to ED for pain once more but was found to have /145. Pt denies history of HTN and says he was last seen by PMD 3 months ago with normal BP. Pt's friend at bedside notes that this morning pt sounded "off" on the phone, he was a bit confused and slow to answer questions. Pt's friend notes that he is now improved but still not fully at baseline.  In ED pt sent for CT stroke protocol which was negative and started on nicardipine gtt, pt then had 1 episode of watery emesis in ED    Pt endorses mildly improved tooth pain after dentistry intervention. He denies headache, dizziness, lightheadedness, Vision changes, N/V/F/C, SOB, cough, MOYA, chest pain, abdominal pain, diarrhea, constipation, dysuria, LE swelling, recent weight loss or sick contacts     ED: morphine 4mg IVP, nicardipine gtt (2018 20:52)      PAST MEDICAL & SURGICAL HISTORY:  No pertinent past medical history  No significant past surgical history    (   ) heart valve replacement  (   ) joint replacement  (   ) pregnancy    MEDICATIONS  (STANDING):  amoxicillin  875 milliGRAM(s)/clavulanate 1 Tablet(s) Oral two times a day  chlorhexidine 4% Liquid 1 Application(s) Topical <User Schedule>  enoxaparin Injectable 40 milliGRAM(s) SubCutaneous every 24 hours  hydrochlorothiazide 12.5 milliGRAM(s) Oral daily  lisinopril 10 milliGRAM(s) Oral daily  NIFEdipine XL 30 milliGRAM(s) Oral at bedtime    MEDICATIONS  (PRN):  HYDROmorphone  Injectable 0.5 milliGRAM(s) IV Push every 4 hours PRN Severe Pain (7 - 10)  ibuprofen  Tablet. 600 milliGRAM(s) Oral every 6 hours PRN Mild Pain (1 - 3)  oxyCODONE    5 mG/acetaminophen 325 mG 1 Tablet(s) Oral every 6 hours PRN Moderate Pain (4 - 6)      REVIEW OF SYSTEMS      General:	    Skin/Breast:  	  Ophthalmologic:  	  ENMT:	    Respiratory and Thorax:  	  Cardiovascular:	    Gastrointestinal:	    Genitourinary:	    Musculoskeletal:	    Neurological:	    Psychiatric:	    Hematology/Lymphatics:	    Endocrine:	    Allergic/Immunologic:	    Allergies    No Known Allergies    Intolerances        FAMILY HISTORY:  No pertinent family history in first degree relatives      *SOCIAL HISTORY: (   ) Tobacco; (   ) ETOH    Vital Signs Last 24 Hrs  T(C): 36.4 (2018 05:49), Max: 36.7 (2018 18:00)  T(F): 97.5 (2018 05:49), Max: 98.1 (2018 18:00)  HR: 87 (2018 12:00) (86 - 104)  BP: 166/98 (2018 12:00) (136/76 - 184/96)  BP(mean): 126 (2018 12:00) (121 - 134)  RR: 17 (2018 12:00) (17 - 22)  SpO2: 100% (2018 22:30) (98% - 100%)    LABS:                        16.4   10.20 )-----------( 152      ( 2018 06:57 )             47.5         139  |  98  |  14  ----------------------------<  96  3.8   |  26  |  1.0    Ca    9.2      2018 06:57  Mg     2.1         TPro  6.8  /  Alb  4.5  /  TBili  1.3<H>  /  DBili  x   /  AST  21  /  ALT  23  /  AlkPhos  73      Platelet Count - Automated: 152 K/uL [130 - 400] ( @ 06:57)  WBC Count: 10.20 K/uL [4.80 - 10.80] ( @ 06:57)    Urinalysis Basic - ( 2018 07:47 )    Color: Yellow / Appearance: Cloudy / S.010 / pH: x  Gluc: x / Ketone: Negative  / Bili: Negative / Urobili: 1.0 mg/dL   Blood: x / Protein: Negative mg/dL / Nitrite: Negative   Leuk Esterase: Negative / RBC: x / WBC x   Sq Epi: x / Non Sq Epi: Occasional /HPF / Bacteria: x      PT/INR - ( 2018 15:18 )   PT: 12.20 sec;   INR: 1.06 ratio         PTT - ( 2018 15:18 )  PTT:33.2 sec    Last Dental Visit: << Patient reports last dental appointment was the week previously >>    EOE:  TMJ (   ) clicks                     (   ) pops                     (   ) crepitus             Mandible <<FROM>>             Facial bones and MOM <<grossly intact>>             (  x ) trismus             (   ) lymphadenopathy             (   ) swelling             (   ) asymmetry             (   ) palpation             (   ) dyspnea             (   ) dysphagia             (   ) loss of consciousness    IOE:  multiple missing teeth, periodontitis                hard/soft palate:  (   ) palatal torus, <<No pathology noted>>            tongue/FOM <<No pathology noted>>            labial/buccal mucosa <<No pathology noted>>           (  x ) percussion           (   ) palpation           (   ) swelling            (   ) abscess           (   ) sinus tract    *DENTAL RADIOGRAPHS: 1 periapical exposed #18    RADIOLOGY & ADDITIONAL STUDIES: none    *ASSESSMENT: #18 displays open margin on crown with symptomatic apical periodontitis    *PLAN: Extract #18    PROCEDURE:   Verbal and written consent given.  Anesthesia: << 1 carpule 4% septocaine with 1:100,000 epinephrine and 2 carpules 3% mepivicaine with no epinephrine   >>   Treatment: << Treatment consequences discussed as sheet dated 00. Side site obtained. Local anesthesia administered. #18 removed without complications. Curettage and irrigation used. Hemostasis achieved with gauze. Post op administered.  Blood pressure is 206/89 >>     RECOMMENDATIONS:  1) << Amoxicillin 500 mg, 21 tabs and Ibuprofen 600 mg, 10 tabs  >>  2) Dental F/U with outpatient dentist for comprehensive dental care.   3) If any difficulty swallowing/breathing, fever occur, return to ER.     Resident Name, pager # [phone]

## 2018-11-20 NOTE — DISCHARGE NOTE ADULT - CARE PROVIDERS DIRECT ADDRESSES
SpringfieldNeatifAri[facility] Spine and Joint [email or web address]

## 2018-11-20 NOTE — DISCHARGE NOTE ADULT - MEDICATION SUMMARY - MEDICATIONS TO TAKE
I will START or STAY ON the medications listed below when I get home from the hospital:    ibuprofen 600 mg oral tablet  -- 1 tab(s) by mouth every 6 hours, As needed, Mild Pain (1 - 3)  -- Indication: For tooth pain    NIFEdipine 30 mg oral tablet, extended release  -- 1 tab(s) by mouth once a day (at bedtime)  -- Indication: For HTN    chlorthalidone 25 mg oral tablet  -- 0.5 tab(s) by mouth once a day   -- Avoid prolonged or excessive exposure to direct and/or artificial sunlight while taking this medication.  It is very important that you take or use this exactly as directed.  Do not skip doses or discontinue unless directed by your doctor.  It may be advisable to drink a full glass orange juice or eat a banana daily while taking this medication.    -- Indication: For HTN    glucosamine  -- Indication: For for joints    amoxicillin 500 mg oral tablet  -- 1 tab(s) by mouth 2 times a day   -- Finish all this medication unless otherwise directed by prescriber.    -- Indication: For tooth extraction

## 2018-11-20 NOTE — DISCHARGE NOTE ADULT - ADDITIONAL INSTRUCTIONS
follow-up with your PMD in 1 week follow-up with your PMD and nephrologist in 1 week. Please call to make an appointment with Dr. Mcdonald. Please call to make an appointment with Dr. Mcdonald, schedule follow-up for 1 week. You have an appointment at the [facility] clinic with a new PMD 12/14 at 8:30 am. Please follow-up with your dentist within the week as an outpatient

## 2018-11-20 NOTE — PROGRESS NOTE ADULT - SUBJECTIVE AND OBJECTIVE BOX
Patient is a 57y old  Male who presents with a chief complaint of Hypertensive emergency / AMS (2018 09:23)    HPI:  56 y/o M no significant PMH presents to ED for chronic tooth pain s/p recent tooth capping found to have hypertensive emergency.  Pt had a capping of a left lower tooth about a month and a half ago and has been re-evaluated multiple times for continued pain. Today he presented to ED for pain once more but was found to have /145. Pt denies history of HTN and says he was last seen by PMD 3 months ago with normal BP. Pt's friend at bedside notes that this morning pt sounded "off" on the phone, he was a bit confused and slow to answer questions. Pt's friend notes that he is now improved but still not fully at baseline.  In ED pt sent for CT stroke protocol which was negative and started on nicardipine gtt, pt then had 1 episode of watery emesis in ED    Pt endorses mildly improved tooth pain after dentistry intervention. He denies headache, dizziness, lightheadedness, Vision changes, N/V/F/C, SOB, cough, MOYA, chest pain, abdominal pain, diarrhea, constipation, dysuria, LE swelling, recent weight loss or sick contacts     ED: morphine 4mg IVP, nicardipine gtt (2018 20:52)    PAST MEDICAL & SURGICAL HISTORY:  No pertinent past medical history  No significant past surgical history    patient seen and examined independently on morning rounds, chart reviewed and discussed with the medicine resident.    overnight patient downgraded to regular medical floor from ICU- continues to c/o Left sided dental/tooth pain- no chest pain or sob- bp slowly improving    Vital Signs Last 24 Hrs  T(C): 36.4 (2018 05:49), Max: 36.7 (2018 18:00)  T(F): 97.5 (2018 05:49), Max: 98.1 (2018 18:00)  HR: 93 (2018 05:49) (78 - 104)  BP: 163/98 (2018 07:00) (136/76 - 194/99)  BP(mean): 121 (2018 20:00) (121 - 140)  RR: 18 (2018 21:56) (12 - 37)  SpO2: 100% (2018 22:30) (96% - 100%)             PE:  GEN-NAD, AAOx3  PULM- Clear to auscultation bilaterally, fair air entry  CVS- +s1/s2 RRR no murmurs  GI- soft NT ND +bs, no rebound, no guarding  EXT- no edema  SKIN- no rashes/no lesions  NEURO- nonfocal               16.4   10.20 )-----------( 152      ( 2018 06:57 )             47.5     1120    139  |  98  |  14  ----------------------------<  96  3.8   |  26  |  1.0    Ca    9.2      2018 06:57  Mg     2.1         TPro  6.8  /  Alb  4.5  /  TBili  1.3<H>  /  DBili  x   /  AST  21  /  ALT  23  /  AlkPhos  73      CARDIAC MARKERS ( 2018 04:14 )  x     / <0.01 ng/mL / x     / x     / x      CARDIAC MARKERS ( 2018 15:18 )  x     / <0.01 ng/mL / x     / x     / x          Urinalysis Basic - ( 2018 07:47 )    Color: Yellow / Appearance: Cloudy / S.010 / pH: x  Gluc: x / Ketone: Negative  / Bili: Negative / Urobili: 1.0 mg/dL   Blood: x / Protein: Negative mg/dL / Nitrite: Negative   Leuk Esterase: Negative / RBC: x / WBC x   Sq Epi: x / Non Sq Epi: Occasional /HPF / Bacteria: x          MEDICATIONS  (STANDING):  amLODIPine   Tablet 10 milliGRAM(s) Oral daily  amoxicillin  875 milliGRAM(s)/clavulanate 1 Tablet(s) Oral two times a day  chlorhexidine 4% Liquid 1 Application(s) Topical <User Schedule>  enoxaparin Injectable 40 milliGRAM(s) SubCutaneous every 24 hours

## 2018-11-20 NOTE — DISCHARGE NOTE ADULT - CARE PROVIDER_API CALL
Jovon Mcdonald), Internal Medicine; Nephrology  [address] [place] [address]  Phone: [phone]  Fax: [phone] Jovon Mcdonald), Internal Medicine; Nephrology  [address] [place] [address]  Phone: [phone]  Fax: [phone]    CHRISTIAN Fermin, [address], [place] [address]  Hours:  Phone: [phone]  Fax: (       -

## 2018-11-20 NOTE — DISCHARGE NOTE ADULT - PATIENT PORTAL LINK FT
You can access the [facility] Patient Portal, offered by [facility], by registering with the following website: [email or web address]

## 2018-11-20 NOTE — PROGRESS NOTE ADULT - ASSESSMENT
Patient is a 58 y/o male with no PMHx who presented with tooth pain, found to have hypertensive emergency of 269/145 and AMS.    #HTN emergency  - pt was in ICU on nicardipine gtt, currently off the gtt  - Renal doppler shows mild/moderate stenosis of L renal artery - prelim read, f/u final read  - TSH is normal, ruling out thyroid abnormality  - Protein/creatinine ratio 0.1, ruling out nephrotic syndrome  -F/u urine metanephrines (reports episodes of anxiety/palpitations,) cortisol, PRA, rylan  -D/c Norvasc per renal  -Add Chlorthalidone 12.5 mg in AM and 1 dose now per renal  -Add Procardia XL 30 mg nightly per renal    # AMS 2/2 HTN emergency  - stroke code called, CTH negative for acute intracranial pathology    # Dental pain  - pt seen by dentistry  - tooth # 18 extracted on 11/20  - Amoxicillin 500 mg, 21 tabs and Ibuprofen 600 mg, 10 tabs  per dental    DVt/GI ppx  reg diet  Full code  Dispo: pt would like to go home today

## 2018-11-20 NOTE — PROGRESS NOTE ADULT - ASSESSMENT
Patient is a 58 y/o male with no PMHx who presented with hypertensive emergency and AMS.    #HTN emergency Patient is a 58 y/o male with no PMHx who presented with tooth pain, found to have hypertensive emergency of 269/145 and AMS.    #HTN emergency  BP most recently 163/98, ranging from 136/76 - 194/99 in 24 hours, stable with amlodipine 10 mg  Renal doppler shows mild/moderate stenosis of L renal artery  TSH is normal  -F/u urine metanephrines (reports episodes of anxiety/palpitations,) cortisol, PRA, rylan  -Consider adding second medication for BP maintenance Patient is a 58 y/o male with no PMHx who presented with tooth pain, found to have hypertensive emergency of 269/145 and AMS.    #HTN emergency  BP most recently 163/98, ranging from 136/76 - 194/99 in 24 hours, stable with amlodipine 10 mg  Renal doppler shows mild/moderate stenosis of L renal artery  TSH is normal, ruling out thyroid abnormality  Protein/creatinine ratio 0.1, ruling out nephrotic syndrome  -F/u urine metanephrines (reports episodes of anxiety/palpitations,) cortisol, PRA, rylan  -Consider adding second medication for BP maintenance  -F/u labs outpatient if stable Patient is a 56 y/o male with no PMHx who presented with tooth pain, found to have hypertensive emergency of 269/145 and AMS.    #HTN emergency  BP most recently 163/98, ranging from 136/76 - 194/99 in 24 hours, stable with amlodipine 10 mg  Renal doppler shows mild/moderate stenosis of L renal artery  TSH is normal, ruling out thyroid abnormality  Protein/creatinine ratio 0.1, ruling out nephrotic syndrome  -F/u urine metanephrines (reports episodes of anxiety/palpitations,) cortisol, PRA, rylan  -D/c Norvask  -Add Chlorthalidone 12.5 mg in AM and 1 dose now  -Add Procardia XL 30 mg nightly Patient is a 58 y/o male with no PMHx who presented with tooth pain, found to have hypertensive emergency of 269/145 and AMS.    #HTN emergency  BP most recently 163/98, ranging from 136/76 - 194/99 in 24 hours,  Renal doppler shows mild/moderate stenosis of L renal artery  TSH is normal, ruling out thyroid abnormality  Protein/creatinine ratio 0.1, ruling out nephrotic syndrome  -F/u urine metanephrines (reports episodes of anxiety/palpitations,) cortisol, PRA, rylan  -D/c Norvasc  -Add Chlorthalidone 12.5 mg in AM and 1 dose now  -Add Procardia XL 30 mg nightly

## 2018-11-20 NOTE — PROGRESS NOTE ADULT - SUBJECTIVE AND OBJECTIVE BOX
OVERNIGHT EVENTS:     Subjective:   pt s/e at bedside, pt has no active complaints    HISTORY OF PRESENTING ILLNESS:   Reason for Admission: Hypertensive emergency / AMS	  History of Present Illness: 	  56 y/o M no significant PMH presents to ED for chronic tooth pain s/p recent tooth capping found to have hypertensive emergency.  Pt had a capping of a left lower tooth about a month and a half ago and has been re-evaluated multiple times for continued pain. Today he presented to ED for pain once more but was found to have /145. Pt denies history of HTN and says he was last seen by PMD 3 months ago with normal BP. Pt's friend at bedside notes that this morning pt sounded "off" on the phone, he was a bit confused and slow to answer questions. Pt's friend notes that he is now improved but still not fully at baseline.  In ED pt sent for [facility] stroke protocol which was negative and started on nicardipine gtt, pt then had 1 episode of watery emesis in ED    Pt endorses mildly improved tooth pain after dentistry intervention. He denies headache, dizziness, lightheadedness, Vision changes, N/V/F/C, SOB, cough, MOYA, chest pain, abdominal pain, diarrhea, constipation, dysuria, LE swelling, recent weight loss or sick contacts     ED: morphine 4mg IVP, nicardipine gtt  Past Medical History:  No pertinent past medical history.    Past Surgical History:  No significant past surgical history.  MEDICATIONS:  STANDING MEDICATIONS  amLODIPine   Tablet 10 milliGRAM(s) Oral daily  amoxicillin  875 milliGRAM(s)/clavulanate 1 Tablet(s) Oral two times a day  chlorhexidine 4% Liquid 1 Application(s) Topical <User Schedule>  enoxaparin Injectable 40 milliGRAM(s) SubCutaneous every 24 hours  lisinopril 10 milliGRAM(s) Oral daily    PRN MEDICATIONS  oxyCODONE    5 mG/acetaminophen 325 mG 1 Tablet(s) Oral every 6 hours PRN    VITALS:   T(F): 97.5  HR: 93  BP: 163/98  RR: 18  SpO2: 100%    LABS:                        16.4   10.20 )-----------( 152      ( 2018 06:57 )             47.5     11-20    139  |  98  |  14  ----------------------------<  96  3.8   |  26  |  1.0    Ca    9.2      2018 06:57  Mg     2.1     -    TPro  6.8  /  Alb  4.5  /  TBili  1.3<H>  /  DBili  x   /  AST  21  /  ALT  23  /  AlkPhos  73  11-20    PT/INR - ( 2018 15:18 )   PT: 12.20 sec;   INR: 1.06 ratio         PTT - ( 2018 15:18 )  PTT:33.2 sec  Urinalysis Basic - ( 2018 07:47 )    Color: Yellow / Appearance: Cloudy / S.010 / pH: x  Gluc: x / Ketone: Negative  / Bili: Negative / Urobili: 1.0 mg/dL   Blood: x / Protein: Negative mg/dL / Nitrite: Negative   Leuk Esterase: Negative / RBC: x / WBC x   Sq Epi: x / Non Sq Epi: Occasional /HPF / Bacteria: x      CARDIAC MARKERS ( 2018 04:14 )  x     / <0.01 ng/mL / x     / x     / x      CARDIAC MARKERS ( 2018 15:18 )  x     / <0.01 ng/mL / x     / x     / x          RADIOLOGY:    PHYSICAL EXAM: OVERNIGHT EVENTS: downgraded from ICU    Subjective:   pt s/e at bedside, pt c/o dental pain. Spoke with dentist regarding possible tooth extraction    HISTORY OF PRESENTING ILLNESS:   Reason for Admission: Hypertensive emergency / AMS	  History of Present Illness: 	  58 y/o M no significant PMH presents to ED for chronic tooth pain s/p recent tooth capping found to have hypertensive emergency.  Pt had a capping of a left lower tooth about a month and a half ago and has been re-evaluated multiple times for continued pain. Today he presented to ED for pain once more but was found to have /145. Pt denies history of HTN and says he was last seen by PMD 3 months ago with normal BP. Pt's friend at bedside notes that this morning pt sounded "off" on the phone, he was a bit confused and slow to answer questions. Pt's friend notes that he is now improved but still not fully at baseline.  In ED pt sent for [facility] stroke protocol which was negative and started on nicardipine gtt, pt then had 1 episode of watery emesis in ED    Pt endorses mildly improved tooth pain after dentistry intervention. He denies headache, dizziness, lightheadedness, Vision changes, N/V/F/C, SOB, cough, MOYA, chest pain, abdominal pain, diarrhea, constipation, dysuria, LE swelling, recent weight loss or sick contacts     ED: morphine 4mg IVP, nicardipine gtt  Past Medical History:  No pertinent past medical history.    Past Surgical History:  No significant past surgical history.  MEDICATIONS:  STANDING MEDICATIONS  amLODIPine   Tablet 10 milliGRAM(s) Oral daily  amoxicillin  875 milliGRAM(s)/clavulanate 1 Tablet(s) Oral two times a day  chlorhexidine 4% Liquid 1 Application(s) Topical <User Schedule>  enoxaparin Injectable 40 milliGRAM(s) SubCutaneous every 24 hours  lisinopril 10 milliGRAM(s) Oral daily    PRN MEDICATIONS  oxyCODONE    5 mG/acetaminophen 325 mG 1 Tablet(s) Oral every 6 hours PRN    VITALS:   T(F): 97.5  HR: 93  BP: 163/98  RR: 18  SpO2: 100%    LABS:                        16.4   10.20 )-----------( 152      ( 2018 06:57 )             47.5     11-20    139  |  98  |  14  ----------------------------<  96  3.8   |  26  |  1.0    Ca    9.2      2018 06:57  Mg     2.1     -20    TPro  6.8  /  Alb  4.5  /  TBili  1.3<H>  /  DBili  x   /  AST  21  /  ALT  23  /  AlkPhos  73  11-20    PT/INR - ( 2018 15:18 )   PT: 12.20 sec;   INR: 1.06 ratio         PTT - ( 2018 15:18 )  PTT:33.2 sec  Urinalysis Basic - ( 2018 07:47 )    Color: Yellow / Appearance: Cloudy / S.010 / pH: x  Gluc: x / Ketone: Negative  / Bili: Negative / Urobili: 1.0 mg/dL   Blood: x / Protein: Negative mg/dL / Nitrite: Negative   Leuk Esterase: Negative / RBC: x / WBC x   Sq Epi: x / Non Sq Epi: Occasional /HPF / Bacteria: x      CARDIAC MARKERS ( 2018 04:14 )  x     / <0.01 ng/mL / x     / x     / x      CARDIAC MARKERS ( 2018 15:18 )  x     / <0.01 ng/mL / x     / x     / x          RADIOLOGY:  < from: CT Brain Stroke Protocol (18 @ 16:54) >    IMPRESSION:     No evidence of acute intracranial pathology.        PHYSICAL EXAM:  Constitutional: NAD, comfortable, asking to go home  HEENT: anicteric sclera, oropharynx clear, MMM, mild swelling to left lower molar area with tenderness  Neck: No JVD, mild swelling to submandibular LN to the L, no tenderness noted  Respiratory: CTAB, no wheezes, rales or rhonchi  Cardiovascular: S1, S2, RRR  Gastrointestinal: BS+, soft, NT/ND  Extremities: No cyanosis or clubbing. No peripheral edema  :  No cabrales   Skin: No rashes  Neuro: AAOx3, no focal deficits, 5/5 motor

## 2018-11-20 NOTE — DISCHARGE NOTE ADULT - CARE PLAN
Principal Discharge DX:	Hypertensive urgency  Goal:	Treatment and Resolution  Assessment and plan of treatment:	- started on nicardipine drip to bring down the blood pressure, monitored in the ICU, blood pressure decreased to a range of 136//112 over the past 24 hours. New blood pressure meds were started which you should continue taking outpatient. Please follow-up with your PMD within 1 week to discuss blood pressure and adjust medications as needed. Please follow-up with your PMD for the lab results during your in-patient stay. Rule-out pheochromocytoma, renal artery stenosis, and follow-up aldosterone/renin values  Secondary Diagnosis:	Dentalgia  Goal:	Tooth extracted by dental  Assessment and plan of treatment:	1) Amoxicillin 500 mg, 20 tabs and Ibuprofen 600 mg as needed  2) follow-up with outpatient dentist for comprehensive dental care.   3) If any difficulty swallowing/breathing, fever occur, return to ER. Principal Discharge DX:	Hypertensive urgency  Goal:	Treatment and Resolution  Assessment and plan of treatment:	- started on nicardipine drip to bring down the blood pressure, monitored in the ICU, blood pressure decreased to a range of 136//112 over the past 24 hours. New blood pressure meds were started which you should continue taking outpatient. Please follow[facility] at [facility] clinic on Dec 14 at 8:30 am blood pressure and adjust medications as needed. Please follow-up with your new nephrologist for the lab results during your in-patient stay. Rule-out pheochromocytoma, renal artery stenosis, and follow-up aldosterone/renin values  Secondary Diagnosis:	Dentalgia  Goal:	Tooth extracted by dental  Assessment and plan of treatment:	1) Amoxicillin 500 mg, 20 tabs and Ibuprofen 600 mg as needed  2) follow-up with outpatient dentist for comprehensive dental care.   3) If any difficulty swallowing/breathing, fever occur, return to ER.

## 2018-11-20 NOTE — DISCHARGE NOTE ADULT - PROVIDER TOKENS
JANEL:'[id number]' TOKEN:'[id number]',FREE:[LAST:[CHRISTIAN Fermin],FIRST:[Anselmo],PHONE:[phone]],FAX:[(   [phone]    -],ADDRESS:[[address] [place] [address]  Hours:]]

## 2018-11-20 NOTE — DISCHARGE NOTE ADULT - PLAN OF CARE
Treatment and Resolution - started on nicardipine drip to bring down the blood pressure, monitored in the ICU, blood pressure decreased to a range of 136//112 over the past 24 hours. New blood pressure meds were started which you should continue taking outpatient. Please follow-up with your PMD within 1 week to discuss blood pressure and adjust medications as needed. Please follow-up with your PMD for the lab results during your in-patient stay. Rule-out pheochromocytoma, renal artery stenosis, and follow-up aldosterone/renin values Tooth extracted by dental 1) Amoxicillin 500 mg, 20 tabs and Ibuprofen 600 mg as needed  2) follow-up with outpatient dentist for comprehensive dental care.   3) If any difficulty swallowing/breathing, fever occur, return to ER. - started on nicardipine drip to bring down the blood pressure, monitored in the ICU, blood pressure decreased to a range of 136//112 over the past 24 hours. New blood pressure meds were started which you should continue taking outpatient. Please follow-up at the [facility] on Dec 14 at 8:30 am blood pressure and adjust medications as needed. Please follow-up with your new nephrologist for the lab results during your in-patient stay. Rule-out pheochromocytoma, renal artery stenosis, and follow-up aldosterone/renin values

## 2018-11-21 VITALS — HEART RATE: 87 BPM | SYSTOLIC BLOOD PRESSURE: 137 MMHG | DIASTOLIC BLOOD PRESSURE: 82 MMHG | TEMPERATURE: 99 F

## 2018-11-21 LAB
CREAT ?TM UR-MCNC: 35 MG/DL — SIGNIFICANT CHANGE UP
PROT ?TM UR-MCNC: <5 MG/DLG/24H — SIGNIFICANT CHANGE UP
PROT/CREAT UR-RTO: <0.1 RATIO — SIGNIFICANT CHANGE UP (ref 0–0.2)

## 2018-11-21 RX ADMIN — CHLORHEXIDINE GLUCONATE 1 APPLICATION(S): 213 SOLUTION TOPICAL at 05:25

## 2018-11-21 RX ADMIN — Medication 12.5 MILLIGRAM(S): at 05:26

## 2018-11-21 RX ADMIN — Medication 1 TABLET(S): at 05:26

## 2018-11-21 RX ADMIN — ENOXAPARIN SODIUM 40 MILLIGRAM(S): 100 INJECTION SUBCUTANEOUS at 05:25

## 2018-11-21 NOTE — PROGRESS NOTE ADULT - SUBJECTIVE AND OBJECTIVE BOX
OVERNIGHT EVENTS: None- SBP controlled    Subjective:   pt s/e at bedside, no active complaints, pain in the mouth has improved, pt would like to go home.   Will arrange [facility] appointment for pt. He does not have a nephrologist, will have him see Dr. Mcdonald OP    HISTORY OF PRESENTING ILLNESS:   Reason for Admission: Hypertensive emergency / AMS	  History of Present Illness: 	  56 y/o M no significant PMH presents to ED for chronic tooth pain s/p recent tooth capping found to have hypertensive emergency.  Pt had a capping of a left lower tooth about a month and a half ago and has been re-evaluated multiple times for continued pain. Today he presented to ED for pain once more but was found to have /145. Pt denies history of HTN and says he was last seen by PMD 3 months ago with normal BP. Pt's friend at bedside notes that this morning pt sounded "off" on the phone, he was a bit confused and slow to answer questions. Pt's friend notes that he is now improved but still not fully at baseline.  In ED pt sent for [facility] stroke protocol which was negative and started on nicardipine gtt, pt then had 1 episode of watery emesis in ED    Pt endorses mildly improved tooth pain after dentistry intervention. He denies headache, dizziness, lightheadedness, Vision changes, N/V/F/C, SOB, cough, MOYA, chest pain, abdominal pain, diarrhea, constipation, dysuria, LE swelling, recent weight loss or sick contacts     ED: morphine 4mg IVP, nicardipine gtt  Past Medical History:  No pertinent past medical history.    Past Surgical History:  No significant past surgical history.  MEDICATIONS:  STANDING MEDICATIONS  amoxicillin  875 milliGRAM(s)/clavulanate 1 Tablet(s) Oral two times a day  chlorhexidine 4% Liquid 1 Application(s) Topical <User Schedule>  enoxaparin Injectable 40 milliGRAM(s) SubCutaneous every 24 hours  hydrochlorothiazide 12.5 milliGRAM(s) Oral daily  NIFEdipine XL 30 milliGRAM(s) Oral at bedtime    PRN MEDICATIONS  HYDROmorphone  Injectable 0.5 milliGRAM(s) IV Push every 4 hours PRN  ibuprofen  Tablet. 600 milliGRAM(s) Oral every 6 hours PRN  oxyCODONE    5 mG/acetaminophen 325 mG 1 Tablet(s) Oral every 6 hours PRN    VITALS:   T(F): 98.8  HR: 87  BP: 137/82  RR: 18  SpO2: 96%    LABS:                        16.4   10.20 )-----------( 152      ( 20 Nov 2018 06:57 )             47.5     11-20    139  |  98  |  14  ----------------------------<  96  3.8   |  26  |  1.0    Ca    9.2      20 Nov 2018 06:57  Mg     2.1     11-20    TPro  6.8  /  Alb  4.5  /  TBili  1.3<H>  /  DBili  x   /  AST  21  /  ALT  23  /  AlkPhos  73  11-20          RADIOLOGY:  < from: CT Brain Stroke Protocol (11.18.18 @ 16:54) >    IMPRESSION:     No evidence of acute intracranial pathology.    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  No evidence of significant hemodynamic stenosis in the right renal artery.      PHYSICAL EXAM:    Constitutional: NAD, comfortable, asking to go home  HEENT: anicteric sclera, oropharynx clear, MMM, mild swelling to left lower molar area with tenderness  Neck: No JVD, mild swelling to submandibular LN to the L, no tenderness noted  Respiratory: CTAB, no wheezes, rales or rhonchi  Cardiovascular: S1, S2, RRR  Gastrointestinal: BS+, soft, NT/ND  Extremities: No cyanosis or clubbing. No peripheral edema  :  No cabrales   Skin: No rashes  Neuro: AAOx3, no focal deficits, 5/5 motor

## 2018-11-21 NOTE — PROGRESS NOTE ADULT - ASSESSMENT
a/p:    patient seen and examined independently on morning rounds, chart reviewed and discussed with medicine resident       time spendt on discharge >30 minutes including coordination of discharge care    discharge home today- plan for f/u with outpatient pcp (patient given appointment for [facility] in event unable to f/u with pcp--[facility] 12/14 @ 8:30 am---patient to call [phone] if needs to change)  -also f/u with renal clinic and dental clinic    -cont current bp regimen (chlorthalidone 12.5 mg daily and procardia xl 30 mg qhs)  -continue 10 more days po amoxicillin for dental infection and f/u with dental clinica s outaptient    DISCHARGE home today      FULL CODE

## 2018-11-21 NOTE — PROGRESS NOTE ADULT - SUBJECTIVE AND OBJECTIVE BOX
Patient is a 57y old  Male who presents with a chief complaint of Hypertensive emergency / AMS (2018 09:23)    HPI:  58 y/o M no significant PMH presents to ED for chronic tooth pain s/p recent tooth capping found to have hypertensive emergency.  Pt had a capping of a left lower tooth about a month and a half ago and has been re-evaluated multiple times for continued pain. Today he presented to ED for pain once more but was found to have /145. Pt denies history of HTN and says he was last seen by PMD 3 months ago with normal BP. Pt's friend at bedside notes that this morning pt sounded "off" on the phone, he was a bit confused and slow to answer questions. Pt's friend notes that he is now improved but still not fully at baseline.  In ED pt sent for CT stroke protocol which was negative and started on nicardipine gtt, pt then had 1 episode of watery emesis in ED    Pt endorses mildly improved tooth pain after dentistry intervention. He denies headache, dizziness, lightheadedness, Vision changes, N/V/F/C, SOB, cough, MOYA, chest pain, abdominal pain, diarrhea, constipation, dysuria, LE swelling, recent weight loss or sick contacts     ED: morphine 4mg IVP, nicardipine gtt (2018 20:52)    PAST MEDICAL & SURGICAL HISTORY:  No pertinent past medical history  No significant past surgical history    patient seen and examined independently on morning rounds, chart reviewed and discussed with the medicine resident.    no overnight events---antihypertensive meds adjusted and bp better controlled---  plan for discharge home today with f/u renal, pcp and dental     PE:  GEN-NAD, AAOx3  PULM- Clear to auscultation bilaterally, fair air entry  CVS- +s1/s2 RRR no murmurs  GI- soft NT ND +bs, no rebound, no guarding  EXT- no edema  SKIN- no rashes/no lesions  NEURO- nonfocal               16.4   10.20 )-----------( 152      ( 2018 06:57 )             47.5     11-    139  |  98  |  14  ----------------------------<  96  3.8   |  26  |  1.0    Ca    9.2      2018 06:57  Mg     2.1         TPro  6.8  /  Alb  4.5  /  TBili  1.3<H>  /  DBili  x   /  AST  21  /  ALT  23  /  AlkPhos  73  -    CARDIAC MARKERS ( 2018 04:14 )  x     / <0.01 ng/mL / x     / x     / x      CARDIAC MARKERS ( 2018 15:18 )  x     / <0.01 ng/mL / x     / x     / x          Urinalysis Basic - ( 2018 07:47 )    Color: Yellow / Appearance: Cloudy / S.010 / pH: x  Gluc: x / Ketone: Negative  / Bili: Negative / Urobili: 1.0 mg/dL   Blood: x / Protein: Negative mg/dL / Nitrite: Negative   Leuk Esterase: Negative / RBC: x / WBC x   Sq Epi: x / Non Sq Epi: Occasional /HPF / Bacteria: x

## 2018-11-21 NOTE — PROGRESS NOTE ADULT - PROVIDER SPECIALTY LIST ADULT
Critical Care
Hospitalist
Hospitalist
Internal Medicine
Internal Medicine
Nephrology
Dental
Nephrology

## 2018-11-21 NOTE — PROGRESS NOTE ADULT - ASSESSMENT
Patient is a 58 y/o male with no PMHx who presented with tooth pain, found to have hypertensive emergency of 269/145 and AMS.    #HTN emergency  - pt was in ICU on nicardipine gtt, currently off the gtt  - Renal doppler shows mild/moderate stenosis of L renal artery - prelim read, f/u final read  - TSH is normal, ruling out thyroid abnormality  - Protein/creatinine ratio 0.1, ruling out nephrotic syndrome  -F/u urine metanephrines (reports episodes of anxiety/palpitations,) cortisol, PRA, rylan  -D/c Norvasc per renal  -Add Chlorthalidone 12.5 mg in AM and 1 dose now per renal  -Add Procardia XL 30 mg nightly per renal    # AMS 2/2 HTN emergency  - stroke code called, CTH negative for acute intracranial pathology    # Dental pain  - pt seen by dentistry  - tooth # 18 extracted on 11/20  - Amoxicillin 500 mg, 21 tabs and Ibuprofen 600 mg, 10 tabs  per dental    DVt/GI ppx  reg diet  Full code  Dispo: home in am

## 2018-11-21 NOTE — PROGRESS NOTE ADULT - SUBJECTIVE AND OBJECTIVE BOX
Nephrology progress note    Patient is seen and examined, events over the last 24 h noted. Left lower molar #18 taken out yesterday.    Patient slept well overnight, no complaints at this time. Notes that tooth pain has improved s/p extraction yesterday. Eating well, urinating well. 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 Denies f/c, n/v/d/c, CP, SOB, HA, lightheadedness, swelling, confusion, back pain.    Allergies:  No Known Allergies    Hospital Medications:   MEDICATIONS  (STANDING):  amoxicillin  875 milliGRAM(s)/clavulanate 1 Tablet(s) Oral two times a day  chlorhexidine 4% Liquid 1 Application(s) Topical <User Schedule>  enoxaparin Injectable 40 milliGRAM(s) SubCutaneous every 24 hours  hydrochlorothiazide 12.5 milliGRAM(s) Oral daily  NIFEdipine XL 30 milliGRAM(s) Oral at bedtime      VITALS:  Vital Signs Last 24 Hrs  T(C): 37.1 (2018 05:54), Max: 37.4 (2018 20:20)  T(F): 98.8 (2018 05:54), Max: 99.4 (2018 20:20)  HR: 87 (2018 05:54) (87 - 114)  BP: 137/82 (2018 05:54) (124/71 - 194/115)  BP(mean): 126 (2018 12:00) (126 - 126)  RR: 18 (2018 20:20) (17 - 18)  SpO2: 96% (2018 19:53) (96% - 96%)         @ 07:01  -   @ 07:00  --------------------------------------------------------  IN: 360 mL / OUT: 700 mL / NET: -340 mL          PHYSICAL EXAM:  Constitutional: NAD, comfortably conversing  HEENT: anicteric sclera, oropharynx clear, MMM, swelling to left lower buccal mucosa with missing tooth #18 since yesterday  Neck: No JVD, mild swelling to left submandibular LN, no tenderness  Respiratory: CTAB, no wheezes, rales or rhonchi  Cardiovascular: S1, S2, RRR  Gastrointestinal: BS+, soft, NT/ND  Extremities: No cyanosis or clubbing. No peripheral edema  :  No cabrales.   Skin: No rashes    LABS:      139  |  98  |  14  ----------------------------<  96  3.8   |  26  |  1.0    Ca    9.2      2018 06:57  Mg     2.1         TPro  6.8  /  Alb  4.5  /  TBili  1.3<H>  /  DBili      /  AST  21  /  ALT  23  /  AlkPhos  73                            16.4   10. )-----------( 152      ( 2018 06:57 )             47.5       Urine Studies:  Urinalysis Basic - ( 2018 07:47 )    Color: Yellow / Appearance: Cloudy / S.010 / pH:   Gluc:  / Ketone: Negative  / Bili: Negative / Urobili: 1.0 mg/dL   Blood:  / Protein: Negative mg/dL / Nitrite: Negative   Leuk Esterase: Negative / RBC:  / WBC    Sq Epi:  / Non Sq Epi: Occasional /HPF / Bacteria:       Creatinine, Random Urine: 58 mg/dL ( @ 07:47)  Protein/Creatinine Ratio Calculation: 0.1 Ratio ( @ 07:47)    RADIOLOGY & ADDITIONAL STUDIES: Nephrology progress note    Patient is seen and examined, events over the last 24 h noted. Left lower molar #18 extracted yesterday. BP remained stable o/n.    Patient slept well overnight, no complaints at this time. Notes that tooth pain has improved s/p extraction yesterday. Eating well, urinating well. 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 Denies f/c, n/v/d/c, CP, SOB, HA, lightheadedness, swelling, confusion, back pain.    Allergies:  No Known Allergies    Home Medications:  glucosamine:  (2018 21:05)    Hospital Medications:   MEDICATIONS  (STANDING):  amoxicillin  875 milliGRAM(s)/clavulanate 1 Tablet(s) Oral two times a day  chlorhexidine 4% Liquid 1 Application(s) Topical <User Schedule>  enoxaparin Injectable 40 milliGRAM(s) SubCutaneous every 24 hours  hydrochlorothiazide 12.5 milliGRAM(s) Oral daily  NIFEdipine XL 30 milliGRAM(s) Oral at bedtime      VITALS:  Vital Signs Last 24 Hrs  T(C): 37.1 (2018 05:54), Max: 37.4 (2018 20:20)  T(F): 98.8 (2018 05:54), Max: 99.4 (2018 20:20)  HR: 87 (2018 05:54) (87 - 114)  BP: 137/82 (2018 05:54) (124/71 - 194/115)  BP(mean): 126 (2018 12:00) (126 - 126)  RR: 18 (2018 20:20) (17 - 18)  SpO2: 96% (2018 19:53) (96% - 96%)       @ 07:01  -   @ 07:00  --------------------------------------------------------  IN: 360 mL / OUT: 700 mL / NET: -340 mL      PHYSICAL EXAM:  Constitutional: NAD, comfortably conversing  HEENT: anicteric sclera, oropharynx clear, MMM, swelling to left lower buccal mucosa with missing tooth #18 since yesterday  Neck: No JVD, mild swelling to left submandibular LN, no tenderness  Respiratory: CTAB, no wheezes, rales or rhonchi  Cardiovascular: S1, S2, RRR  Gastrointestinal: BS+, soft, NT/ND  Extremities: No cyanosis or clubbing. No peripheral edema  :  No cabrales.   Skin: No rashes    LABS:      139  |  98  |  14  ----------------------------<  96  3.8   |  26  |  1.0    Ca    9.2      2018 06:57  Mg     2.1         TPro  6.8  /  Alb  4.5  /  TBili  1.3<H>  /  DBili      /  AST  21  /  ALT  23  /  AlkPhos  73                            16.4   10.20 )-----------( 152      ( 2018 06:57 )             47.5       Urine Studies:  Urinalysis Basic - ( 2018 07:47 )    Color: Yellow / Appearance: Cloudy / S.010 / pH:   Gluc:  / Ketone: Negative  / Bili: Negative / Urobili: 1.0 mg/dL   Blood:  / Protein: Negative mg/dL / Nitrite: Negative   Leuk Esterase: Negative / RBC:  / WBC    Sq Epi:  / Non Sq Epi: Occasional /HPF / Bacteria:       Creatinine, Random Urine: 58 mg/dL ( @ 07:47)  Protein/Creatinine Ratio Calculation: 0.1 Ratio ( @ 07:47)    RADIOLOGY & ADDITIONAL STUDIES:

## 2018-11-21 NOTE — PROGRESS NOTE ADULT - ATTENDING COMMENTS
I was Physically Present for the key portions of the evaluation   I agree with the above History  , Physical examination Assessment and plan   I have Reviewed , Modified or appended where appropriate.  Please check A and P as above   1- HTN better controlled on HCTZ / CCB  OP renal follow up at [facility]
I was Physically Present for the key portions of the evaluation   I agree with the above History  , Physical examination Assessment and plan   I have Reviewed , Modified or appended where appropriate.  Please check A and P as above

## 2018-11-21 NOTE — PROGRESS NOTE ADULT - REASON FOR ADMISSION
Hypertensive emergency / AMS

## 2018-11-21 NOTE — PROGRESS NOTE ADULT - ASSESSMENT
58 y/o male, no PMHx, presented for tooth pain and admitted for HTN emergency with AMS, BP has stabilized since admission 58 y/o male, no PMHx, presented for tooth pain and admitted for HTN emergency with AMS, BP has stabilized since admission    #HTN emergency  BP most recently 137/82, ranging from 124-137/71-82 in past 12 hours  Taking chlorthalidone 12.5 mg in AM, procardia xl 30 mg nightly  -F/u urine metanephrines (reports episodes of anxiety/palpitations,) cortisol, PRA, rylan  -D/c when medically cleared with BP monitoring at home as well as close outpatient follow-up 58 y/o male, no PMHx, presented for tooth pain and admitted for HTN emergency with AMS, BP has stabilized since admission    #HTN emergency  BP most recently 137/82, ranging from 124-137/71-82 in past 12 hours  Taking HCTZ 12.5 mg in AM, procardia xl 30 mg nightly  -F/u urine metanephrines (reports episodes of anxiety/palpitations,) cortisol, PRA, rylan  -D/c when medically cleared with BP monitoring at home as well as close outpatient follow-up

## 2018-11-27 LAB
METANEPH UR-MCNC: SIGNIFICANT CHANGE UP
METHYLMALONATE UR-MCNC: 0.96 — SIGNIFICANT CHANGE UP

## 2018-11-28 DIAGNOSIS — K08.89 OTHER SPECIFIED DISORDERS OF TEETH AND SUPPORTING STRUCTURES: ICD-10-CM

## 2018-11-28 DIAGNOSIS — I16.1 HYPERTENSIVE EMERGENCY: ICD-10-CM

## 2018-11-28 DIAGNOSIS — K04.7 PERIAPICAL ABSCESS WITHOUT SINUS: ICD-10-CM

## 2018-11-28 DIAGNOSIS — I10 ESSENTIAL (PRIMARY) HYPERTENSION: ICD-10-CM

## 2018-11-28 DIAGNOSIS — K08.539 FRACTURED DENTAL RESTORATIVE MATERIAL, UNSPECIFIED: ICD-10-CM

## 2018-11-28 DIAGNOSIS — K04.5 CHRONIC APICAL PERIODONTITIS: ICD-10-CM

## 2018-11-28 DIAGNOSIS — I70.1 ATHEROSCLEROSIS OF RENAL ARTERY: ICD-10-CM

## 2018-12-06 LAB — ALDOST 24H UR-MCNC: SIGNIFICANT CHANGE UP

## 2018-12-14 ENCOUNTER — APPOINTMENT (OUTPATIENT)
Dept: INTERNAL MEDICINE | Facility: CLINIC | Age: 58
End: 2018-12-14

## 2019-01-24 ENCOUNTER — TRANSCRIPTION ENCOUNTER (OUTPATIENT)
Age: 59
End: 2019-01-24

## 2019-04-11 NOTE — H&P ADULT - NSCORESITESY/N_GEN_A_CORE_RD
89 year old male with Pmhx of Dementia x 5 years, was brought to ED by EMS for syncope and fall X 5 that happened one day prior to admission, 4/8/19.
No

## 2019-05-19 ENCOUNTER — EMERGENCY (EMERGENCY)
Facility: HOSPITAL | Age: 59
LOS: 0 days | Discharge: HOME | End: 2019-05-20
Attending: EMERGENCY MEDICINE | Admitting: EMERGENCY MEDICINE
Payer: COMMERCIAL

## 2019-05-19 VITALS
OXYGEN SATURATION: 96 % | HEART RATE: 93 BPM | TEMPERATURE: 97 F | RESPIRATION RATE: 18 BRPM | SYSTOLIC BLOOD PRESSURE: 125 MMHG | DIASTOLIC BLOOD PRESSURE: 72 MMHG

## 2019-05-19 DIAGNOSIS — M54.5 LOW BACK PAIN: ICD-10-CM

## 2019-05-19 DIAGNOSIS — Z79.899 OTHER LONG TERM (CURRENT) DRUG THERAPY: ICD-10-CM

## 2019-05-19 DIAGNOSIS — Z79.2 LONG TERM (CURRENT) USE OF ANTIBIOTICS: ICD-10-CM

## 2019-05-19 PROCEDURE — 99283 EMERGENCY DEPT VISIT LOW MDM: CPT

## 2019-05-19 RX ORDER — METHOCARBAMOL 500 MG/1
1000 TABLET, FILM COATED ORAL ONCE
Refills: 0 | Status: COMPLETED | OUTPATIENT
Start: 2019-05-19 | End: 2019-05-19

## 2019-05-19 RX ORDER — ACETAMINOPHEN 500 MG
975 TABLET ORAL ONCE
Refills: 0 | Status: COMPLETED | OUTPATIENT
Start: 2019-05-19 | End: 2019-05-19

## 2019-05-19 RX ORDER — KETOROLAC TROMETHAMINE 30 MG/ML
30 SYRINGE (ML) INJECTION ONCE
Refills: 0 | Status: DISCONTINUED | OUTPATIENT
Start: 2019-05-19 | End: 2019-05-19

## 2019-05-19 RX ADMIN — METHOCARBAMOL 1000 MILLIGRAM(S): 500 TABLET, FILM COATED ORAL at 21:08

## 2019-05-19 RX ADMIN — Medication 30 MILLIGRAM(S): at 21:08

## 2019-05-19 RX ADMIN — Medication 30 MILLIGRAM(S): at 21:38

## 2019-05-19 RX ADMIN — Medication 975 MILLIGRAM(S): at 23:03

## 2019-05-19 NOTE — ED PROVIDER NOTE - ATTENDING CONTRIBUTION TO CARE
left lower back pain that is gradual onset since last night without fevers, no hx of trauma, no weight loss, no abd pain, no vomiting, no chest pain, no urinary complaints, no numbness, no hesitancy without urination or defecation. He is able to ambulate, he has nml sensation, nml strenght in lower ext. IMP: lower back pain, tx with nsaids and muscle relexant.

## 2019-05-19 NOTE — ED PROVIDER NOTE - PHYSICAL EXAMINATION
VITAL SIGNS: I have reviewed nursing notes and confirm.  CONSTITUTIONAL: Well-developed; well-nourished; in no acute distress.  SKIN: Skin exam is warm and dry, no acute rash.  HEAD: Normocephalic; atraumatic.  EYES: Conjunctiva and sclera clear.  ENT: No nasal discharge; airway clear.   NECK: Supple; non tender.  CARD: S1, S2 normal; no murmurs, gallops, or rubs. Regular rate and rhythm.  RESP: No wheezes, rales or rhonchi. Speaking in full sentences.   ABD: Normal bowel sounds; soft; non-distended; non-tender; No rebound or guarding. No CVA tenderness.  BACK: No midline TTP. (+) left lumbar paraspinal TTP.   EXT: Normal ROM. No clubbing, cyanosis or edema.  NEURO: Alert, oriented. Grossly unremarkable. No focal deficits. CN II-XII intact. No dysmetria. No ataxia. Sensation intact and equal throughout. Strength 5/5 throughout. Gait steady.

## 2019-05-19 NOTE — ED PROVIDER NOTE - NSFOLLOWUPCLINICS_GEN_ALL_ED_FT
[facility] ([facility])  Rehabilitation  [facility] [address] [place] [address]  Phone: [phone]  Fax:   Follow Up Time: 1-3 Days

## 2019-05-19 NOTE — ED PROVIDER NOTE - PROGRESS NOTE DETAILS
Pt reports improvement in symptoms. Pt understands to follow-up with PMD/rehab clinic. Pt understands to return to ED if symptoms return/worsen. Agreeable to discharge.

## 2019-05-19 NOTE — ED PROVIDER NOTE - NS ED ROS FT
Review of Systems  Constitutional:  No fever, chills.  Eyes:  No visual changes, eye pain, or discharge.  Cardiac:  No chest pain, palpitations, syncope.  Respiratory:  No dyspnea, cough. No hemoptysis.  GI:  No nausea, vomiting, diarrhea, or abdominal pain.   :  No dysuria, hematuria, frequency, or burning.   MS:  (+) back pain.  Skin:  No skin rash, pruritis, jaundice, or lesions.  Neuro:  No headache, dizziness, loss of sensation, or focal weakness.  No change in mental status.   Endocrine: No history of thyroid disease or diabetes.

## 2019-05-19 NOTE — ED PROVIDER NOTE - OBJECTIVE STATEMENT
59 yo M with PMHx of HTN presents to the ED c/o moderate left sided low back pain. Symptoms started last night and have been persisting. Pt applied muscle pain relief cream with minimal relief of pain. Pt states movement exacerbates pain and laying flat relieves pain. Pt denies other complaints. Pt denies fever, chills, nausea, vomiting, abdominal pain, diarrhea, headache, dizziness, weakness, chest pain, SOB, LOC, trauma, urinary symptoms.

## 2019-05-19 NOTE — ED ADULT NURSE NOTE - OBJECTIVE STATEMENT
Pt presented to ED d/t back pt. As per pt, pain 10/10 chronic back pain . Denies trauma/fall. Denies n/v/d.

## 2019-05-19 NOTE — ED PROVIDER NOTE - NSFOLLOWUPINSTRUCTIONS_ED_ALL_ED_FT
Back Pain    Back pain is very common in adults. The cause of back pain is rarely dangerous and the pain often gets better over time. The cause of your back pain may not be known and may include strain of muscles or ligaments, degeneration of the spinal disks, or arthritis. Occasionally the pain may radiate down your leg(s). Over-the-counter medicines to reduce pain and inflammation are often the most helpful. Stretching and remaining active frequently helps the healing process.     SEEK IMMEDIATE MEDICAL CARE IF YOU HAVE ANY OF THE FOLLOWING SYMPTOMS: bowel or bladder control problems, unusual weakness or numbness in your arms or legs, nausea or vomiting, abdominal pain, fever, dizziness/lightheadedness.     Musculoskeletal Pain    Musculoskeletal pain is muscle and bone aches and pains. This pain can occur in any part of the body.    Follow these instructions at home:  Only take medicines for pain, discomfort, or fever as told by your health care provider.  You may continue all activities unless the activities cause more pain. When the pain lessens, slowly resume normal activities. Gradually increase the intensity and duration of the activities or exercise.  During periods of severe pain, bed rest may be helpful. Lie or sit in any position that is comfortable, but get out of bed and walk around at least every several hours.  If directed, put ice on the injured area.    Put ice in a plastic bag.  Place a towel between your skin and the bag.  Leave the ice on for 20 minutes, 2–3 times a day.    Contact a health care provider if:  Your pain is getting worse.  Your pain is not relieved with medicines.  You lose function in the area of the pain if the pain is in your arms, legs, or neck.  This information is not intended to replace advice given to you by your health care provider. Make sure you discuss any questions you have with your health care provider.

## 2019-05-20 VITALS — DIASTOLIC BLOOD PRESSURE: 81 MMHG | RESPIRATION RATE: 18 BRPM | SYSTOLIC BLOOD PRESSURE: 141 MMHG | HEART RATE: 71 BPM

## 2019-05-20 RX ORDER — IBUPROFEN 200 MG
1 TABLET ORAL
Qty: 28 | Refills: 0
Start: 2019-05-20 | End: 2019-05-26

## 2019-05-20 RX ORDER — METHOCARBAMOL 500 MG/1
2 TABLET, FILM COATED ORAL
Qty: 30 | Refills: 0
Start: 2019-05-20 | End: 2019-05-24

## 2019-11-19 NOTE — CONSULT NOTE ADULT - CONSULT REQUESTED DATE/TIME
18-Nov-2018 18:34
19-Nov-2018 06:21
19-Nov-2018 16:24
You can access the FollowMyHealth Patient Portal offered by [facility] by registering at the following website: [email or web address]. By joining [facility]s FollowMyHealth portal, you will also be able to view your health information using other applications (apps) compatible with our system.

## 2021-07-27 NOTE — PATIENT PROFILE ADULT - BRADEN ACTIVITY
HPI     Pallavi David is a 66year old male here for f/u of Primary cancer of left upper lobe of lung (hcc)  (primary encounter diagnosis)  Brain metastasis (hcc)  Malignant pericardial effusion (hcc)  Bone metastasis (hcc)  Malignant neoplasm metastat disturbance (uses CPAP). Current Outpatient Medications   Medication Sig Dispense Refill   • Terazosin HCl 5 MG Oral Cap Take 1 capsule (5 mg total) by mouth daily.  AT 4PM 90 capsule 3   • finasteride 5 MG Oral Tab Take 1 tablet (5 mg total) by m cycloSPORINE 0.05 % Ophthalmic Emulsion Place into both eyes 2 (two) times daily. • Fluticasone-Umeclidin-Vilant 100-62.5-25 MCG/INH Inhalation Aerosol Powder, Breath Activated Inhale 1 puff into the lungs daily.  1 each 0   • Loperamide HCl 2 MG Oral C Use      Vaping Use: Never used    Alcohol use: No    Drug use: No      Family History   Problem Relation Age of Onset   • Cancer Father    • Hypertension Mother    • Hypertension Brother          PHYSICAL EXAM:    /58 (BP Location: Left arm, Patient treatment on 09/25/20. CT head 12/21/20 with marked response to SBRT. Hypokalemia: Potassium chloride 20 mEq BID -resume      He is currently status post cycle 13 of maintenance therapy with single agent pembrolizumab.   PET scan after cycle 11 continues GFR, -American 84 >=60    ALT 25 16 - 61 U/L    AST 15 15 - 37 U/L    Alkaline Phosphatase 69 45 - 117 U/L    Bilirubin, Total 0.6 0.1 - 2.0 mg/dL    Total Protein 7.9 6.4 - 8.2 g/dL    Albumin 3.1 (L) 3.4 - 5.0 g/dL    Globulin  4.8 (H) 2.8 - 4.4 g (3) walks occasionally

## 2022-02-01 ENCOUNTER — TRANSCRIPTION ENCOUNTER (OUTPATIENT)
Age: 62
End: 2022-02-01

## 2022-09-16 ENCOUNTER — APPOINTMENT (OUTPATIENT)
Dept: SURGERY | Facility: CLINIC | Age: 62
End: 2022-09-16

## 2022-09-16 VITALS
OXYGEN SATURATION: 95 % | BODY MASS INDEX: 26.66 KG/M2 | DIASTOLIC BLOOD PRESSURE: 84 MMHG | HEIGHT: 65 IN | SYSTOLIC BLOOD PRESSURE: 128 MMHG | WEIGHT: 160 LBS | HEART RATE: 69 BPM | TEMPERATURE: 96.9 F

## 2022-09-16 DIAGNOSIS — Z78.9 OTHER SPECIFIED HEALTH STATUS: ICD-10-CM

## 2022-09-16 DIAGNOSIS — I10 ESSENTIAL (PRIMARY) HYPERTENSION: ICD-10-CM

## 2022-09-16 PROCEDURE — 99203 OFFICE O/P NEW LOW 30 MIN: CPT

## 2022-09-16 PROCEDURE — 99243 OFF/OP CNSLTJ NEW/EST LOW 30: CPT

## 2022-09-16 RX ORDER — LOSARTAN POTASSIUM 100 MG/1
100 TABLET, FILM COATED ORAL
Refills: 0 | Status: ACTIVE | COMMUNITY

## 2022-09-19 NOTE — HISTORY OF PRESENT ILLNESS
[de-identified] : JANIYA NUR is a 61 year man who is referred by Dr. Diaz for evaluation of an abdominal mass. Pt describes mass in the suprapubic region which he has noted for 'many years,' which started as being small in size but grew larger over the prior 6 months. He denies antecedent trauma; it has always remained soft, without overlying cellulitic, ulceration, drainage. He denies any pain to the area though cosmetically it bothers him. He denies lesions elsewhere; the pt would like it removed if possible.

## 2022-09-19 NOTE — CONSULT LETTER
[Dear  ___] : Dear  [unfilled], [Consult Letter:] : I had the pleasure of evaluating your patient, [unfilled]. [Please see my note below.] : Please see my note below. [Sincerely,] : Sincerely, [FreeTextEntry3] : Shekhar Livingston MD

## 2022-09-19 NOTE — REASON FOR VISIT
[Initial Evaluation] : an initial evaluation [FreeTextEntry1] : Pt here for very large  lump at bottom of stomach .

## 2022-09-19 NOTE — PHYSICAL EXAM
[JVD] : no jugular venous distention  [Respiratory Effort] : normal respiratory effort [No Rash or Lesion] : No rash or lesion [Alert] : alert [Oriented to Person] : oriented to person [Oriented to Place] : oriented to place [Oriented to Time] : oriented to time [Calm] : calm [de-identified] : Well appearing man, in NAD seated [de-identified] : NCAT; sclerae anicteric [de-identified] : Supple; trachea midline [de-identified] : Soft; nondistended. Nontender. There is a 4-5cm mass which is soft and semi mobile, in the lower abdominal midline just cephalad to the pubic symphisis. No overlying cellulitis or tenderness, fluctuance. No hernias present. [de-identified] : FROM [de-identified] : Warm; dry.

## 2022-09-19 NOTE — PLAN
[FreeTextEntry1] : - Plan for excision of abdominal mass.  We reviewed the risks, benefits, and alternatives to the above procedure including but not limited to bleeding, wound healing complications, perioperative cardiac arrhythmia, MI, VTE. Understanding these, the pt wishes to proceed.\par \par Prior to surgery pt will require PST as well as COVID-19 testing.

## 2022-09-19 NOTE — ASSESSMENT
[FreeTextEntry1] : 62yo man with relatively asymptomatic mass which is in the lower abdominal midline, seemingly subcutaneous. Suspect lipoma or other benign neoplasm.

## 2022-09-19 NOTE — DATA REVIEWED
[FreeTextEntry1] : Bedside ultrasound in office: solid lesion, with sharp and well defined borders and minimal internal vascularity.

## 2022-09-22 ENCOUNTER — NON-APPOINTMENT (OUTPATIENT)
Age: 62
End: 2022-09-22

## 2022-10-03 ENCOUNTER — OUTPATIENT (OUTPATIENT)
Dept: OUTPATIENT SERVICES | Facility: HOSPITAL | Age: 62
LOS: 1 days | Discharge: HOME | End: 2022-10-03

## 2022-10-03 VITALS
WEIGHT: 164.24 LBS | HEART RATE: 60 BPM | DIASTOLIC BLOOD PRESSURE: 87 MMHG | HEIGHT: 65 IN | OXYGEN SATURATION: 99 % | SYSTOLIC BLOOD PRESSURE: 159 MMHG | RESPIRATION RATE: 15 BRPM | TEMPERATURE: 98 F

## 2022-10-03 DIAGNOSIS — R19.00 INTRA-ABDOMINAL AND PELVIC SWELLING, MASS AND LUMP, UNSPECIFIED SITE: ICD-10-CM

## 2022-10-03 DIAGNOSIS — Z01.818 ENCOUNTER FOR OTHER PREPROCEDURAL EXAMINATION: ICD-10-CM

## 2022-10-03 LAB
ALBUMIN SERPL ELPH-MCNC: 5.1 G/DL — SIGNIFICANT CHANGE UP (ref 3.5–5.2)
ALP SERPL-CCNC: 97 U/L — SIGNIFICANT CHANGE UP (ref 30–115)
ALT FLD-CCNC: 25 U/L — SIGNIFICANT CHANGE UP (ref 0–41)
ANION GAP SERPL CALC-SCNC: 11 MMOL/L — SIGNIFICANT CHANGE UP (ref 7–14)
APTT BLD: 30.3 SEC — SIGNIFICANT CHANGE UP (ref 27–39.2)
AST SERPL-CCNC: 18 U/L — SIGNIFICANT CHANGE UP (ref 0–41)
BASOPHILS # BLD AUTO: 0.03 K/UL — SIGNIFICANT CHANGE UP (ref 0–0.2)
BASOPHILS NFR BLD AUTO: 0.4 % — SIGNIFICANT CHANGE UP (ref 0–1)
BILIRUB SERPL-MCNC: 0.4 MG/DL — SIGNIFICANT CHANGE UP (ref 0.2–1.2)
BUN SERPL-MCNC: 13 MG/DL — SIGNIFICANT CHANGE UP (ref 10–20)
CALCIUM SERPL-MCNC: 9.8 MG/DL — SIGNIFICANT CHANGE UP (ref 8.4–10.5)
CHLORIDE SERPL-SCNC: 99 MMOL/L — SIGNIFICANT CHANGE UP (ref 98–110)
CO2 SERPL-SCNC: 29 MMOL/L — SIGNIFICANT CHANGE UP (ref 17–32)
CREAT SERPL-MCNC: 1.2 MG/DL — SIGNIFICANT CHANGE UP (ref 0.7–1.5)
EGFR: 69 ML/MIN/1.73M2 — SIGNIFICANT CHANGE UP
EOSINOPHIL # BLD AUTO: 0.33 K/UL — SIGNIFICANT CHANGE UP (ref 0–0.7)
EOSINOPHIL NFR BLD AUTO: 4.7 % — SIGNIFICANT CHANGE UP (ref 0–8)
GLUCOSE SERPL-MCNC: 84 MG/DL — SIGNIFICANT CHANGE UP (ref 70–99)
HCT VFR BLD CALC: 50.8 % — SIGNIFICANT CHANGE UP (ref 42–52)
HGB BLD-MCNC: 18 G/DL — SIGNIFICANT CHANGE UP (ref 14–18)
IMM GRANULOCYTES NFR BLD AUTO: 0.6 % — HIGH (ref 0.1–0.3)
INR BLD: 0.99 RATIO — SIGNIFICANT CHANGE UP (ref 0.65–1.3)
LYMPHOCYTES # BLD AUTO: 1.32 K/UL — SIGNIFICANT CHANGE UP (ref 1.2–3.4)
LYMPHOCYTES # BLD AUTO: 18.7 % — LOW (ref 20.5–51.1)
MCHC RBC-ENTMCNC: 32.8 PG — HIGH (ref 27–31)
MCHC RBC-ENTMCNC: 35.4 G/DL — SIGNIFICANT CHANGE UP (ref 32–37)
MCV RBC AUTO: 92.7 FL — SIGNIFICANT CHANGE UP (ref 80–94)
MONOCYTES # BLD AUTO: 0.47 K/UL — SIGNIFICANT CHANGE UP (ref 0.1–0.6)
MONOCYTES NFR BLD AUTO: 6.6 % — SIGNIFICANT CHANGE UP (ref 1.7–9.3)
NEUTROPHILS # BLD AUTO: 4.88 K/UL — SIGNIFICANT CHANGE UP (ref 1.4–6.5)
NEUTROPHILS NFR BLD AUTO: 69 % — SIGNIFICANT CHANGE UP (ref 42.2–75.2)
NRBC # BLD: 0 /100 WBCS — SIGNIFICANT CHANGE UP (ref 0–0)
PLATELET # BLD AUTO: 180 K/UL — SIGNIFICANT CHANGE UP (ref 130–400)
POTASSIUM SERPL-MCNC: 3.8 MMOL/L — SIGNIFICANT CHANGE UP (ref 3.5–5)
POTASSIUM SERPL-SCNC: 3.8 MMOL/L — SIGNIFICANT CHANGE UP (ref 3.5–5)
PROT SERPL-MCNC: 7.2 G/DL — SIGNIFICANT CHANGE UP (ref 6–8)
PROTHROM AB SERPL-ACNC: 11.3 SEC — SIGNIFICANT CHANGE UP (ref 9.95–12.87)
RBC # BLD: 5.48 M/UL — SIGNIFICANT CHANGE UP (ref 4.7–6.1)
RBC # FLD: 11.9 % — SIGNIFICANT CHANGE UP (ref 11.5–14.5)
SODIUM SERPL-SCNC: 139 MMOL/L — SIGNIFICANT CHANGE UP (ref 135–146)
WBC # BLD: 7.07 K/UL — SIGNIFICANT CHANGE UP (ref 4.8–10.8)
WBC # FLD AUTO: 7.07 K/UL — SIGNIFICANT CHANGE UP (ref 4.8–10.8)

## 2022-10-03 PROCEDURE — 93010 ELECTROCARDIOGRAM REPORT: CPT

## 2022-10-03 NOTE — H&P PST ADULT - EYES COMMENTS
PT WEARS GLASSES--   LEFT UPPER EYE LID- STYE NOTED- PT STATES--I AM GOING TO MY PMD TO HAVE MY EYE EXAMINED.

## 2022-10-03 NOTE — H&P PST ADULT - TEMPERATURE IN CELSIUS (DEGREES C)
URGENT CARE NOTE    Chief Complaint   Patient presents with   • Vomiting     patient arrives with complaint of n/v/d since Sunday.  He states he missed two days of work due to symptoms.  He states today one episode of vomiting and two episodes of diarrhea.   • Diarrhea       HPI: Vicente rCaft is a 51 year old male who comes in today complaining of nausea, vomiting and diarrhea that started on 1/6/19. No blood in stool.  Has eaten little today and drank water without issue. One episode of vomiting today and two episodes of diarrhea. Has had some chills.  Has had normal urination without dysuria or frequency. Denies eating anything out of the ordinary, but notes he did have chicken teriyaki prior to symptoms starting. Denies others with similar symptoms. No recent antibiotics. No recent travel. He notes he feels he is starting to get better. He would like a note for work as he missed yesterday and today. Feels he would like to go back tomorrow.      Current Outpatient Medications   Medication Sig Dispense Refill   • ondansetron (ZOFRAN ODT) 4 MG disintegrating tablet Place 1 tablet onto the tongue every 8 hours as needed for Nausea. 20 tablet 0   • hyoscyamine (ANASPAZ,LEVSIN) 0.125 MG tablet Take 1 tablet by mouth every 4 hours as needed for Cramping. 10 tablet 0   • albuterol 108 (90 Base) MCG/ACT inhaler Inhale 2 puffs into the lungs every 4 hours as needed for Shortness of Breath or Wheezing. 1 Inhaler 1   • guaiFENesin-codeine (GUAIFENESIN AC) 100-10 MG/5ML liquid Take 5 mLs by mouth 3 times daily as needed for Cough. 120 mL 0   • ranitidine (ZANTAC) 150 MG tablet Take 1 tablet by mouth 2 times daily. 14 tablet 1   • potassium chloride (K-DUR,KLOR-CON) 20 MEQ CR tablet Take 1 tablet by mouth daily. 10 tablet 1     No current facility-administered medications for this visit.      ALLERGIES:   Allergen Reactions   • Pcn-200 200 [Echinacea] RASH     History  reviewed. No pertinent past medical history.  No family history on file.  Social History     Socioeconomic History   • Marital status: Single     Spouse name: Not on file   • Number of children: Not on file   • Years of education: Not on file   • Highest education level: Not on file   Social Needs   • Financial resource strain: Not on file   • Food insecurity - worry: Not on file   • Food insecurity - inability: Not on file   • Transportation needs - medical: Not on file   • Transportation needs - non-medical: Not on file   Occupational History   • Not on file   Tobacco Use   • Smoking status: Former Smoker   • Smokeless tobacco: Never Used   Substance and Sexual Activity   • Alcohol use: Yes     Alcohol/week: 2.4 - 3.0 oz     Types: 4 - 5 Cans of beer per week   • Drug use: Yes     Types: Marijuana     Comment: Almost daily.   • Sexual activity: Not on file   Other Topics Concern   • Not on file   Social History Narrative   • Not on file     Social History     Tobacco Use   Smoking Status Former Smoker   Smokeless Tobacco Never Used       ROS:   Pertinent positives as noted in HPI.    PHYSICAL EXAMINATION:  Visit Vitals  BP (!) 156/91   Pulse 68   Temp 97.4 °F (36.3 °C)   Resp 16   Ht 6' (1.829 m)   Wt 77.1 kg   SpO2 98%   BMI 23.06 kg/m²       Constitutional:  Well developed, well nourished, no acute distress, non-toxic appearance   Eyes: conjunctiva normal   HENT:  Atraumatic, normocephalic, external ears normal, external nose is normal, oropharynx moist  Respiratory:  No respiratory distress, normal breath sounds, no rales, no wheezing   Cardiovascular:  Normal rate, normal rhythm, no murmurs, no gallops, no rubs   GI:  Soft, skin overlying normal, nondistended, normal bowel sounds, nontender, no mass, no rebound, no guarding   :  No costovertebral angle tenderness   Integument:  Well  hydrated, warm and dry, no skin lesions or rash noted  Neurologic:  Alert &  appropriate     ASSESSMENT:  1. Nausea vomiting  and diarrhea           PLAN:  Orders Placed This Encounter   • ondansetron (ZOFRAN ODT) 4 MG disintegrating tablet   • hyoscyamine (ANASPAZ,LEVSIN) 0.125 MG tablet     Exam reassuring. Will treat supportively with zofran and levsin. Likely viral GE as this is prevalent in community at this time. Less likely acute abdomen.  Lots of fluids and rest. Take the Zofran as directed as needed. Wait 20-30 minutes after taking this before eating or drinking. Advance diet slowly as tolerated with bland foods. If unable to tolerate solids recommend Gatorade, Powerade or Pedialyte for electrolyte replacement. Follow up with new or worsening symptoms such as inability to tolerate fluids despite zofran, fevers, abdominal pain or any other concerning symptoms.   Be sure that you are taking in lots of fluids especially if you're having persistent diarrhea. Take the Levsin for the abdominal cramping. You can take Imodium to help if you are still having symptoms when you are trying to fly home. If you develop any worsening pain, high fevers, inability to tolerate fluids, light headed, dizziness or any other concerning symptoms he should be re-seen at that time. Otherwise if your symptoms are persistent for 7 days or more I would recommend having follow-up and possibly stool studies at that time.  Patient voices understanding and agreement with treatment plan. No questions at this time all questions were answered during the course of visit. Follow-up primary care as needed.  The patient understands that this is a provisional diagnosis and that the findings from today are a \"picture in time\" of their disease process. If the patient has questions at any time about their diagnosis, disease process, progression, or lack of therapeutic response, they are encouraged to call their primary care provider or the emergency department for further direction. New or changing symptoms often require prompt followup and re-evaluation.    Return for PRN  for lasting or worsening symptoms.     36.6

## 2022-10-03 NOTE — H&P PST ADULT - HISTORY OF PRESENT ILLNESS
PT PRESENTS TO PAST WITH NO SOB, CP, PALPITATIONS, DYSURIA, UTI OR URI AT PRESENT.   PT ABLE TO WALK UP 2-3 FLIGHTS OF STEPS WITH NO SOB.  AS PER THE PT, THIS IS HIS/HER COMPLETE MEDICAL AND SURGICAL HX, INCLUDING MEDICATIONS PRESCRIBED AND OVER THE COUNTER  pt denies any covid s/s, or tested positive in the past--PT AWARE OF DATE AND TME OF COVID TESTING PRIOR TO SURGERY.   pt advised self quarantine till day of procedure  denies travel outside the [place] in the past 30 days  Anesthesia Alert  NO--Difficult Airway  NO--History of neck surgery or radiation  NO--Limited ROM of neck  NO--History of Malignant hyperthermia  NO--Personal or family history of Pseudocholinesterase deficiency  NO--Prior Anesthesia Complication  NO--Latex Allergy  NO--Loose teeth  NO--History of Rheumatoid Arthritis  NO--CASSANDRA  NO BLEEDING RISK  NO--Other_____

## 2022-10-03 NOTE — H&P PST ADULT - REASON FOR ADMISSION
Proceduralist: Shekhar Livingston  Procedure: EXCISION OF ABDOMINAL MASS  Procedure: 60 Minutes  Anesthesia Type: General  PT REPORTS--I HAVE A MASS ON MY STOMACH FOR 15YRS.  RECENTLY IT HAS GOTTEN BIGGER.  I HAVE NO PAIN.

## 2022-10-03 NOTE — H&P PST ADULT - MALLAMPATI CLASS
MULTIPLE MISSING TEETH ON BOTTOM- UPPER DENTURES- PT INSTRUCTED TO REMOVE DOS/Class II - visualization of the soft palate, fauces, and uvula

## 2022-10-03 NOTE — H&P PST ADULT - LIVES WITH, PROFILE
-- Message is from the [facility]--    Provider paged via [employer] - The below message was copied and pasted from a [employer] page:    1/22/2019 2:25:49 PM   [facility] Contact CenterACC NURSE   Jaylin Hawkins Kellie   Secure Text   [phone] [facility] PATIENT NUMBER -------------------------------- United alone

## 2022-10-11 ENCOUNTER — LABORATORY RESULT (OUTPATIENT)
Age: 62
End: 2022-10-11

## 2022-10-12 ENCOUNTER — TRANSCRIPTION ENCOUNTER (OUTPATIENT)
Age: 62
End: 2022-10-12

## 2022-10-12 ENCOUNTER — APPOINTMENT (OUTPATIENT)
Dept: SURGERY | Facility: HOSPITAL | Age: 62
End: 2022-10-12

## 2022-10-12 ENCOUNTER — RESULT REVIEW (OUTPATIENT)
Age: 62
End: 2022-10-12

## 2022-10-12 ENCOUNTER — OUTPATIENT (OUTPATIENT)
Dept: OUTPATIENT SERVICES | Facility: HOSPITAL | Age: 62
LOS: 1 days | Discharge: HOME | End: 2022-10-12

## 2022-10-12 VITALS — SYSTOLIC BLOOD PRESSURE: 150 MMHG | DIASTOLIC BLOOD PRESSURE: 85 MMHG | HEART RATE: 78 BPM | RESPIRATION RATE: 18 BRPM

## 2022-10-12 VITALS
OXYGEN SATURATION: 98 % | DIASTOLIC BLOOD PRESSURE: 92 MMHG | RESPIRATION RATE: 17 BRPM | HEIGHT: 65 IN | TEMPERATURE: 96 F | SYSTOLIC BLOOD PRESSURE: 137 MMHG | HEART RATE: 84 BPM | WEIGHT: 164.91 LBS

## 2022-10-12 PROCEDURE — 88304 TISSUE EXAM BY PATHOLOGIST: CPT | Mod: 26

## 2022-10-12 PROCEDURE — 11406 EXC TR-EXT B9+MARG >4.0 CM: CPT

## 2022-10-12 RX ORDER — ONDANSETRON 8 MG/1
4 TABLET, FILM COATED ORAL ONCE
Refills: 0 | Status: DISCONTINUED | OUTPATIENT
Start: 2022-10-12 | End: 2022-10-26

## 2022-10-12 RX ORDER — HYDROMORPHONE HYDROCHLORIDE 2 MG/ML
0.5 INJECTION INTRAMUSCULAR; INTRAVENOUS; SUBCUTANEOUS
Refills: 0 | Status: DISCONTINUED | OUTPATIENT
Start: 2022-10-12 | End: 2022-10-12

## 2022-10-12 RX ORDER — OXYCODONE AND ACETAMINOPHEN 5; 325 MG/1; MG/1
1 TABLET ORAL ONCE
Refills: 0 | Status: DISCONTINUED | OUTPATIENT
Start: 2022-10-12 | End: 2022-10-12

## 2022-10-12 RX ORDER — SODIUM CHLORIDE 9 MG/ML
1000 INJECTION, SOLUTION INTRAVENOUS
Refills: 0 | Status: DISCONTINUED | OUTPATIENT
Start: 2022-10-12 | End: 2022-10-26

## 2022-10-12 RX ORDER — LOSARTAN POTASSIUM 100 MG/1
1 TABLET, FILM COATED ORAL
Qty: 0 | Refills: 0 | DISCHARGE

## 2022-10-12 RX ADMIN — SODIUM CHLORIDE 100 MILLILITER(S): 9 INJECTION, SOLUTION INTRAVENOUS at 09:08

## 2022-10-12 NOTE — BRIEF OPERATIVE NOTE - NSICDXBRIEFPROCEDURE_GEN_ALL_CORE_FT
PROCEDURES:  Excision, mass, subcutaneous soft tissue, abdominal wall, greater than 3 cm diameter 12-Oct-2022 08:45:02  Monica Jaramillo

## 2022-10-12 NOTE — CHART NOTE - NSCHARTNOTEFT_GEN_A_CORE
PACU ANESTHESIA ADMISSION NOTE      Procedure:   Post op diagnosis:      ____  Intubated  TV:______       Rate: ______      FiO2: ______    _x___  Patent Airway    _x___  Full return of protective reflexes    _x___  Full recovery from anesthesia / back to baseline status    Vitals:    see anesthesia record    Mental Status:  _x___ Awake   _____ Alert   _____ Drowsy   _____ Sedated    Nausea/Vomiting:  _x___  NO       ______Yes,   See Post - Op Orders         Pain Scale (0-10):  _____    Treatment: ____ None    __x__ See Post - Op/PCA Orders    Post - Operative Fluids:   ____ Oral   ___x_ See Post - Op Orders    Plan: Discharge:   _x___Home       _____Floor     _____Critical Care    _____  Other:_________________    Comments:  No anesthesia issues or complications noted.  Discharge when criteria met.

## 2022-10-12 NOTE — ASU DISCHARGE PLAN (ADULT/PEDIATRIC) - ASU DC SPECIAL INSTRUCTIONSFT
You can resume showering tomorrow, but do not bathe. Do not scrub at wounds. The glue placed on incision site will fall off on it's own, do not peel off. Take Tylenol and Motrin as needed for pain control alternating every 4-6 hours. Please call the office to make an appointment with Dr. Livingston in 1-2 weeks, number is provided below. Feel free to call the office with any other questions or concerns.

## 2022-10-12 NOTE — PRE-ANESTHESIA EVALUATION ADULT - NSANTHAPLANRD_GEN_ALL_CORE
With viruses the most important treatment is symptomatic care. This includes: getting plenty of rest and drinking plenty of fluids.           You can use Tylenol and Ibuprofen for fever control and to help with body aches.            For congestion using OTC treatments like Mucinex DM or Sudafed can help           Sore throat: warm salt water rinses can help reduce irritation and inflammation, ibuprofen can help reduce inflammation and pain, and 1TBS honey can also help          Cough: OTC medications can help with the cough if you were not prescribed anything for your cough at today's visit.           Shortness of breath or pain with breathing: Please practice deep breathing exercises as this helps reduce the risk for pneumonia to develop          - May purchase a pulse oximeter to measure oxygen levels at home. Normal oxygen level is %. Return if oxygen level drops below 93% or HR is above 125 beats per minute at rest.       How to discontinue home isolation    People with COVID-19 who have stayed home (home isolated) can stop home isolation under the following conditions:    You have had no fever for at least 48 hours (that is 2 full days of no fever without the use medicine that reduces fevers)    AND    other symptoms have improved (for example, when your cough or shortness of breath have improved)    AND    at least 10 days have passed since your symptoms first appeared          
general/monitored anesthesia care (MAC)

## 2022-10-12 NOTE — ASU PATIENT PROFILE, ADULT - FALL HARM RISK - HARM RISK INTERVENTIONS

## 2022-10-12 NOTE — ASU DISCHARGE PLAN (ADULT/PEDIATRIC) - CARE PROVIDER_API CALL
Shekhar Livingston)  Surgery  [address] [place] [address]  Phone: [phone]  Fax: [phone]  Follow Up Time:

## 2022-10-14 LAB — SURGICAL PATHOLOGY STUDY: SIGNIFICANT CHANGE UP

## 2022-10-17 DIAGNOSIS — I10 ESSENTIAL (PRIMARY) HYPERTENSION: ICD-10-CM

## 2022-10-17 DIAGNOSIS — L72.0 EPIDERMAL CYST: ICD-10-CM

## 2022-10-17 DIAGNOSIS — R22.2 LOCALIZED SWELLING, MASS AND LUMP, TRUNK: ICD-10-CM

## 2022-10-28 ENCOUNTER — APPOINTMENT (OUTPATIENT)
Dept: SURGERY | Facility: CLINIC | Age: 62
End: 2022-10-28

## 2023-02-27 ENCOUNTER — NON-APPOINTMENT (OUTPATIENT)
Age: 63
End: 2023-02-27

## 2023-02-28 ENCOUNTER — EMERGENCY (EMERGENCY)
Facility: HOSPITAL | Age: 63
LOS: 0 days | Discharge: HOME | End: 2023-02-28
Attending: EMERGENCY MEDICINE
Payer: MEDICAID

## 2023-02-28 VITALS
OXYGEN SATURATION: 99 % | DIASTOLIC BLOOD PRESSURE: 99 MMHG | RESPIRATION RATE: 17 BRPM | SYSTOLIC BLOOD PRESSURE: 188 MMHG | TEMPERATURE: 98 F | HEART RATE: 76 BPM | WEIGHT: 160.06 LBS

## 2023-02-28 VITALS — DIASTOLIC BLOOD PRESSURE: 93 MMHG | SYSTOLIC BLOOD PRESSURE: 152 MMHG | HEART RATE: 81 BPM

## 2023-02-28 DIAGNOSIS — R51.9 HEADACHE, UNSPECIFIED: ICD-10-CM

## 2023-02-28 DIAGNOSIS — I10 ESSENTIAL (PRIMARY) HYPERTENSION: ICD-10-CM

## 2023-02-28 PROCEDURE — 70450 CT HEAD/BRAIN W/O DYE: CPT | Mod: 26,MA

## 2023-02-28 PROCEDURE — 70450 CT HEAD/BRAIN W/O DYE: CPT | Mod: MA

## 2023-02-28 PROCEDURE — 99284 EMERGENCY DEPT VISIT MOD MDM: CPT | Mod: 25

## 2023-02-28 PROCEDURE — 99284 EMERGENCY DEPT VISIT MOD MDM: CPT

## 2023-02-28 RX ORDER — ACETAMINOPHEN 500 MG
650 TABLET ORAL ONCE
Refills: 0 | Status: COMPLETED | OUTPATIENT
Start: 2023-02-28 | End: 2023-02-28

## 2023-02-28 RX ORDER — METOCLOPRAMIDE HCL 10 MG
10 TABLET ORAL ONCE
Refills: 0 | Status: COMPLETED | OUTPATIENT
Start: 2023-02-28 | End: 2023-02-28

## 2023-02-28 RX ADMIN — Medication 10 MILLIGRAM(S): at 20:17

## 2023-02-28 RX ADMIN — Medication 650 MILLIGRAM(S): at 20:16

## 2023-02-28 NOTE — ED PROVIDER NOTE - ATTENDING APP SHARED VISIT CONTRIBUTION OF CARE
62-year-old male with past medical history of HTN presents to the ED for elevated blood pressures associated with headache.  Patient states headache has been increasing over the last 2 weeks, located to the top of the head and usually occurring in the evening times.  Headache is not related to any kind of exertion or activity, denies any associated chest pain/shortness of breath/abdominal pain/nausea/vomiting/blurred vision/numbness/weakness/trauma.  Denies any incontinence, denies any slurred speech or facial droop.  Denies any energy drinks, increased caffeine use, missed doses of medications, change in lifestyle or her increased stress.    On exam patient NAD, NCAT, PERRL, EOMI.  No facial bone or sinus tenderness.  No C-spine tenderness.  No chest wall tenderness.  Lungs: CTA B.  Heart: Regular S1-S2.  Abdomen: Soft, ND/NT, + BS, no masses.  EXT: No calf tenderness, distal pulses intact, no edema. Neuro: Alert and oriented x3, motor and sensory intact, no facial droop, slurred speech, no ataxia.    A/P patient with elevated blood pressure and headache today.  Will give medications for the headache, and check CT head.  To reassess    ALL: nkda  Meds: losartan  SH no smoking  or etoh  PMD Joe

## 2023-02-28 NOTE — ED PROVIDER NOTE - OBJECTIVE STATEMENT
62-year-old male history of hypertension who presents to the ER for evaluation of 2 weeks of headaches.  Patient  has had 2 weeks of a headache located to the top of the head.  headache is pressure-like intermittent and worse in the evening.  Symptoms are not affected by position/exertion.  No prior history of headaches/traumas.  Patient  has been checking blood pressure at home due to the headaches and found that it has been elevated.   has follow-up appointment with PMD Dr. Diaz tomorrow.  sts ha is mild. Denies any medication noncompliance, anticoagulant use, visual changes, dizziness, extremity weakness, numbness, paresthesias, off-balance sensation, neck pain or stiffness, fever, chills, cough, URI symptoms.

## 2023-02-28 NOTE — ED PROVIDER NOTE - NSFOLLOWUPINSTRUCTIONS_ED_ALL_ED_FT
Follow up with your PMD in next few days for a reevaluation of your blood pressure and make sure your headache is improving.       Hypertension    Hypertension, commonly called high blood pressure, is when the force of blood pumping through your arteries is too strong. Hypertension forces your heart to work harder to pump blood. Your arteries may become narrow or stiff. Having untreated or uncontrolled hypertension for a long period of time can cause heart attack, stroke, kidney disease, and other problems. If started on a medication, take exactly as prescribed by your health care professional. Maintain a healthy lifestyle and follow up with your primary care physician.    SEEK IMMEDIATE MEDICAL CARE IF YOU HAVE THE FOLLOWING SYMPTOMS:  confusion, chest pain, abdominal pain, vomiting, or shortness of breath.            Headache    A headache is pain or discomfort felt around the head or neck area. The specific cause of a headache may not be found as there are many types including tension headaches, migraine headaches, and cluster headaches. Watch your condition for any changes. Things you can do to manage your pain include taking over the counter and prescription medications as instructed by your health care provider, lying down in a dark quiet room, limiting stress, getting regular sleep, and refraining from alcohol and tobacco products.    SEEK IMMEDIATE MEDICAL CARE IF YOU EXPERIENCE THE FOLLOWING SYMPTOMS: fever, vomiting, stiff neck, loss of vision, problems with speech, muscle weakness, loss of balance, trouble walking, pass out, or confusion.

## 2023-02-28 NOTE — ED PROVIDER NOTE - CLINICAL SUMMARY MEDICAL DECISION MAKING FREE TEXT BOX
62-year-old male with past medical history of HTN presents to the ED for elevated blood pressures associated with headache.  Patient states headache has been increasing over the last 2 weeks, located to the top of the head and usually occurring in the evening times.  Headache is not related to any kind of exertion or activity, denies any associated chest pain/shortness of breath/abdominal pain/nausea/vomiting/blurred vision/numbness/weakness/trauma.  Denies any incontinence, denies any slurred speech or facial droop.  Denies any energy drinks, increased caffeine use, missed doses of medications, change in lifestyle or her increased stress.   CT head neg. Given tylenol/reglan given and improved headache. Head CT neg. To dc and instruct f/u with pmd. return for any worsening.

## 2023-02-28 NOTE — ED PROVIDER NOTE - PHYSICAL EXAMINATION
CONSTITUTIONAL: Well-appearing; well-nourished; in no apparent distress.   EYES: PERRL; EOM intact.   ENT: normal nose; no rhinorrhea; normal pharynx with no tonsillar hypertrophy.   NECK: Supple; non-tender; no cervical lymphadenopathy.   CARDIOVASCULAR: Normal S1, S2; no murmurs, rubs, or gallops.   RESPIRATORY: Normal chest excursion with respiration; breath sounds clear and equal bilaterally; no wheezes, rhonchi, or rales.  GI/: Normal bowel sounds; non-distended; non-tender; no palpable organomegaly.   MS: No evidence of trauma or deformity. Normal ROM in all four extremities; non-tender to palpation; distal pulses are normal.   SKIN: Normal for age and race; warm; dry; good turgor; no apparent lesions or exudate.   NEURO/PSYCH: A & O x 4; grossly unremarkable. No apparent thoughts of harm to self or others. No focal deficits. No facial droop. No tongue deviation. Cerebellar intact. Normal gait. Sensation intact

## 2023-02-28 NOTE — ED ADULT NURSE NOTE - MODE OF DISCHARGE
INTERVAL HPI/OVERNIGHT EVENTS: ***    PRESSORS: [ ] YES [ ] NO  WHICH:    ANTIBIOTICS:                  DATE STARTED:  ANTIBIOTICS:                  DATE STARTED:  ANTIBIOTICS:                  DATE STARTED:    Antimicrobial:    Cardiovascular:    Pulmonary:  ALBUTerol    90 MICROgram(s) HFA Inhaler 2 Puff(s) Inhalation every 6 hours    Hematalogic:  enoxaparin Injectable 40 milliGRAM(s) SubCutaneous daily    Other:  acetaminophen   Tablet .. 650 milliGRAM(s) Oral every 6 hours PRN  ALPRAZolam 1 milliGRAM(s) Oral every 12 hours  chlorhexidine 2% Cloths 1 Application(s) Topical <User Schedule>  dexAMETHasone     Tablet 6 milliGRAM(s) Oral daily  dextrose 5%. 1000 milliLiter(s) IV Continuous <Continuous>  dextrose 50% Injectable 25 Gram(s) IV Push once  dextrose 50% Injectable 12.5 Gram(s) IV Push once  glucagon  Injectable 1 milliGRAM(s) IntraMuscular once  insulin lispro (ADMELOG) corrective regimen sliding scale   SubCutaneous three times a day before meals  levothyroxine 100 MICROGram(s) Oral daily  pantoprazole    Tablet 40 milliGRAM(s) Oral before breakfast  polyethylene glycol 3350 17 Gram(s) Oral daily  senna 2 Tablet(s) Oral at bedtime    acetaminophen   Tablet .. 650 milliGRAM(s) Oral every 6 hours PRN  ALBUTerol    90 MICROgram(s) HFA Inhaler 2 Puff(s) Inhalation every 6 hours  ALPRAZolam 1 milliGRAM(s) Oral every 12 hours  chlorhexidine 2% Cloths 1 Application(s) Topical <User Schedule>  dexAMETHasone     Tablet 6 milliGRAM(s) Oral daily  dextrose 5%. 1000 milliLiter(s) IV Continuous <Continuous>  dextrose 50% Injectable 25 Gram(s) IV Push once  dextrose 50% Injectable 12.5 Gram(s) IV Push once  enoxaparin Injectable 40 milliGRAM(s) SubCutaneous daily  glucagon  Injectable 1 milliGRAM(s) IntraMuscular once  insulin lispro (ADMELOG) corrective regimen sliding scale   SubCutaneous three times a day before meals  levothyroxine 100 MICROGram(s) Oral daily  pantoprazole    Tablet 40 milliGRAM(s) Oral before breakfast  polyethylene glycol 3350 17 Gram(s) Oral daily  senna 2 Tablet(s) Oral at bedtime    Drug Dosing Weight  Height (cm): 162.6 (01 Dec 2020 09:53)  Weight (kg): 85.7 (01 Dec 2020 09:53)  BMI (kg/m2): 32.4 (01 Dec 2020 09:53)  BSA (m2): 1.91 (01 Dec 2020 09:53)    CENTRAL LINE: [ ] YES [ ] NO  LOCATION:   DATE INSERTED:  REMOVE: [ ] YES [ ] NO  EXPLAIN:    DONALD: [ ] YES [ ] NO    DATE INSERTED:  REMOVE:  [ ] YES [ ] NO  EXPLAIN:    A-LINE:  [ ] YES [ ] NO  LOCATION:   DATE INSERTED:  REMOVE:  [ ] YES [ ] NO  EXPLAIN:    PMH -reviewed admission note, no change since admission  PAST MEDICAL & SURGICAL HISTORY:  Lumbar back pain    Osteoporosis    History of tracheal stenosis    Other specified diseases of upper respiratory tract    Hypothyroidism    Kidney stones    Gallstones    Fatty liver    Thyroid disease    Obesity    Constipation    Gastric ulcer  February 2016- h/o GI bleed which patient had to be intubated Jan 2016    Arthritis of shoulder region, right    Arthritis  right shoulder    S/P bronchoscopy  2017    S/P bronchoscopy  5/16    Hyperparathyroidism  2012 parathyroidectomy    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    Gastric ulcer  &quot;four endoscopies&quot; for diagnosis of gastric ulcer in February 2016        ICU Vital Signs Last 24 Hrs  T(C): 36.7 (09 Dec 2020 04:51), Max: 36.9 (09 Dec 2020 00:00)  T(F): 98 (09 Dec 2020 04:51), Max: 98.5 (09 Dec 2020 00:00)  HR: 69 (09 Dec 2020 07:00) (63 - 94)  BP: 117/68 (09 Dec 2020 07:00) (95/53 - 149/90)  BP(mean): 78 (09 Dec 2020 07:00) (70 - 113)  ABP: --  ABP(mean): --  RR: 38 (09 Dec 2020 07:00) (20 - 42)  SpO2: 87% (09 Dec 2020 07:00) (80% - 95%)            12-08 @ 07:01  -  12-09 @ 07:00  --------------------------------------------------------  IN: 850 mL / OUT: 1950 mL / NET: -1100 mL            PHYSICAL EXAM:    GENERAL: [ ]NAD, [ ]well-groomed, [ ]well-developed  HEAD:  [ ]Atraumatic, [ ]Normocephalic  EYES: [ ]EOMI, [ ]PERRLA, [ ]conjunctiva and sclera clear  ENMT: [ ]No tonsillar erythema, exudates, or enlargement; [ ]Moist mucous membranes, [ ]Good dentition, [ ]No lesions  NECK: [ ]Supple, normal appearance, [ ]No JVD; [ ]Normal thyroid; [ ]Trachea midline  NERVOUS SYSTEM:  [ ]Alert & Oriented X3, [ ]Good concentration; [ ]Motor Strength 5/5 B/L upper and lower extremities; [ ]DTRs 2+ intact and symmetric  CHEST/LUNG: [ ]No chest deformity; [ ]Normal percussion bilaterally; [ ]No rales, rhonchi, wheezing   HEART: [ ]Regular rate and rhythm; [ ]No murmurs, rubs, or gallops  ABDOMEN: [ ]Soft, Nontender, Nondistended; [ ]Bowel sounds present  EXTREMITIES:  [ ]2+ Peripheral Pulses, [ ]No clubbing, cyanosis, or edema  LYMPH: [ ]No lymphadenopathy noted  SKIN: [ ]No rashes or lesions; [ ]Good capillary refill      LABS:  CBC Full  -  ( 09 Dec 2020 06:15 )  WBC Count : 18.48 K/uL  RBC Count : 4.20 M/uL  Hemoglobin : 13.0 g/dL  Hematocrit : 40.2 %  Platelet Count - Automated : 459 K/uL  Mean Cell Volume : 95.7 fl  Mean Cell Hemoglobin : 31.0 pg  Mean Cell Hemoglobin Concentration : 32.3 gm/dL  Auto Neutrophil # : 17.10 K/uL  Auto Lymphocyte # : 0.69 K/uL  Auto Monocyte # : 0.52 K/uL  Auto Eosinophil # : 0.02 K/uL  Auto Basophil # : 0.02 K/uL  Auto Neutrophil % : 92.6 %  Auto Lymphocyte % : 3.7 %  Auto Monocyte % : 2.8 %  Auto Eosinophil % : 0.1 %  Auto Basophil % : 0.1 %    12-09    135  |  98  |  18  ----------------------------<  158<H>  4.3   |  26  |  0.50    Ca    10.5      09 Dec 2020 06:15  Phos  2.3     12-09  Mg     2.4     12-09    TPro  6.8  /  Alb  2.1<L>  /  TBili  0.3  /  DBili  x   /  AST  24  /  ALT  165<H>  /  AlkPhos  178<H>  12-09            RADIOLOGY & ADDITIONAL STUDIES REVIEWED:  ***    [ ]GOALS OF CARE DISCUSSION WITH PATIENT/FAMILY/PROXY:    CRITICAL CARE TIME SPENT: 35 minutes INTERVAL HPI/OVERNIGHT EVENTS: Nio significant event overnight, Patient is still tacypneac  Family being updated about the condition.    PRESSORS: [ ] YES [x ] NO  WHICH:    ANTIBIOTICS:                  DATE STARTED:  ANTIBIOTICS:                  DATE STARTED:  ANTIBIOTICS:                  DATE STARTED:    Antimicrobial:    Cardiovascular:    Pulmonary:  ALBUTerol    90 MICROgram(s) HFA Inhaler 2 Puff(s) Inhalation every 6 hours    Hematalogic:  enoxaparin Injectable 40 milliGRAM(s) SubCutaneous daily    Other:  acetaminophen   Tablet .. 650 milliGRAM(s) Oral every 6 hours PRN  ALPRAZolam 1 milliGRAM(s) Oral every 12 hours  chlorhexidine 2% Cloths 1 Application(s) Topical <User Schedule>  dexAMETHasone     Tablet 6 milliGRAM(s) Oral daily  dextrose 5%. 1000 milliLiter(s) IV Continuous <Continuous>  dextrose 50% Injectable 25 Gram(s) IV Push once  dextrose 50% Injectable 12.5 Gram(s) IV Push once  glucagon  Injectable 1 milliGRAM(s) IntraMuscular once  insulin lispro (ADMELOG) corrective regimen sliding scale   SubCutaneous three times a day before meals  levothyroxine 100 MICROGram(s) Oral daily  pantoprazole    Tablet 40 milliGRAM(s) Oral before breakfast  polyethylene glycol 3350 17 Gram(s) Oral daily  senna 2 Tablet(s) Oral at bedtime    acetaminophen   Tablet .. 650 milliGRAM(s) Oral every 6 hours PRN  ALBUTerol    90 MICROgram(s) HFA Inhaler 2 Puff(s) Inhalation every 6 hours  ALPRAZolam 1 milliGRAM(s) Oral every 12 hours  chlorhexidine 2% Cloths 1 Application(s) Topical <User Schedule>  dexAMETHasone     Tablet 6 milliGRAM(s) Oral daily  dextrose 5%. 1000 milliLiter(s) IV Continuous <Continuous>  dextrose 50% Injectable 25 Gram(s) IV Push once  dextrose 50% Injectable 12.5 Gram(s) IV Push once  enoxaparin Injectable 40 milliGRAM(s) SubCutaneous daily  glucagon  Injectable 1 milliGRAM(s) IntraMuscular once  insulin lispro (ADMELOG) corrective regimen sliding scale   SubCutaneous three times a day before meals  levothyroxine 100 MICROGram(s) Oral daily  pantoprazole    Tablet 40 milliGRAM(s) Oral before breakfast  polyethylene glycol 3350 17 Gram(s) Oral daily  senna 2 Tablet(s) Oral at bedtime    Drug Dosing Weight  Height (cm): 162.6 (01 Dec 2020 09:53)  Weight (kg): 85.7 (01 Dec 2020 09:53)  BMI (kg/m2): 32.4 (01 Dec 2020 09:53)  BSA (m2): 1.91 (01 Dec 2020 09:53)    CENTRAL LINE: [ ] YES [ x] NO  LOCATION:   DATE INSERTED:  REMOVE: [ ] YES [ ] NO  EXPLAIN:    DONALD: [ ] YES [x ] NO    DATE INSERTED:  REMOVE:  [ ] YES [ ] NO  EXPLAIN:    A-LINE:  [ ] YES [ x] NO  LOCATION:   DATE INSERTED:  REMOVE:  [ ] YES [ ] NO  EXPLAIN:    PMH -reviewed admission note, no change since admission  PAST MEDICAL & SURGICAL HISTORY:  Lumbar back pain    Osteoporosis    History of tracheal stenosis    Other specified diseases of upper respiratory tract    Hypothyroidism    Kidney stones    Gallstones    Fatty liver    Thyroid disease    Obesity    Constipation    Gastric ulcer  February 2016- h/o GI bleed which patient had to be intubated Jan 2016    Arthritis of shoulder region, right    Arthritis  right shoulder    S/P bronchoscopy  2017    S/P bronchoscopy  5/16    Hyperparathyroidism  2012 parathyroidectomy    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    Gastric ulcer  &quot;four endoscopies&quot; for diagnosis of gastric ulcer in February 2016        ICU Vital Signs Last 24 Hrs  T(C): 36.7 (09 Dec 2020 04:51), Max: 36.9 (09 Dec 2020 00:00)  T(F): 98 (09 Dec 2020 04:51), Max: 98.5 (09 Dec 2020 00:00)  HR: 69 (09 Dec 2020 07:00) (63 - 94)  BP: 117/68 (09 Dec 2020 07:00) (95/53 - 149/90)  BP(mean): 78 (09 Dec 2020 07:00) (70 - 113)  ABP: --  ABP(mean): --  RR: 38 (09 Dec 2020 07:00) (20 - 42)  SpO2: 87% (09 Dec 2020 07:00) (80% - 95%)            12-08 @ 07:01  -  12-09 @ 07:00  --------------------------------------------------------  IN: 850 mL / OUT: 1950 mL / NET: -1100 mL            PHYSICAL EXAM:    GENERAL: [x ]NAD, [ x]well-groomed, [x ]well-developed , Anxious  HEAD:  [x ]Atraumatic, [ ]Normocephalic  EYES: [ x]EOMI, [x ]PERRLA, [ ]conjunctiva and sclera clear  ENMT: [ x]No tonsillar erythema, exudates, or enlargement; [x ]Moist mucous membranes, [ ]Good dentition, [ ]No lesions  NECK: [x ]Supple, normal appearance, [ ]No JVD; [ ]Normal thyroid; [ ]Trachea midline  NERVOUS SYSTEM:  [x ]Alert & Oriented X3, [ x]Good concentration; [ ]Motor Strength 5/5 B/L upper and lower extremities; [ ]DTRs 2+ intact and symmetric  CHEST/LUNG: [x]No chest deformity; [x ]Normal percussion bilaterally; B/L crackles  HEART: [ ]Regular rate and rhythm; [ ]No murmurs, rubs, or gallops  ABDOMEN: [ x]Soft, Nontender, Nondistended; [ x]Bowel sounds present  EXTREMITIES:  [ x]2+ Peripheral Pulses, [x ]No clubbing, cyanosis, or edema  SKIN: [x ]No rashes or lesions; [x ]Good capillary refill      LABS:  CBC Full  -  ( 09 Dec 2020 06:15 )  WBC Count : 18.48 K/uL  RBC Count : 4.20 M/uL  Hemoglobin : 13.0 g/dL  Hematocrit : 40.2 %  Platelet Count - Automated : 459 K/uL  Mean Cell Volume : 95.7 fl  Mean Cell Hemoglobin : 31.0 pg  Mean Cell Hemoglobin Concentration : 32.3 gm/dL  Auto Neutrophil # : 17.10 K/uL  Auto Lymphocyte # : 0.69 K/uL  Auto Monocyte # : 0.52 K/uL  Auto Eosinophil # : 0.02 K/uL  Auto Basophil # : 0.02 K/uL  Auto Neutrophil % : 92.6 %  Auto Lymphocyte % : 3.7 %  Auto Monocyte % : 2.8 %  Auto Eosinophil % : 0.1 %  Auto Basophil % : 0.1 %    12-09    135  |  98  |  18  ----------------------------<  158<H>  4.3   |  26  |  0.50    Ca    10.5      09 Dec 2020 06:15  Phos  2.3     12-09  Mg     2.4     12-09    TPro  6.8  /  Alb  2.1<L>  /  TBili  0.3  /  DBili  x   /  AST  24  /  ALT  165<H>  /  AlkPhos  178<H>  12-09            RADIOLOGY & ADDITIONAL STUDIES REVIEWED:  ***    [ ]GOALS OF CARE DISCUSSION WITH PATIENT/FAMILY/PROXY:    CRITICAL CARE TIME SPENT: 35 minutes Ambulatory

## 2023-02-28 NOTE — ED PROVIDER NOTE - PATIENT PORTAL LINK FT
You can access the FollowMyHealth Patient Portal offered by [facility] by registering at the following website: [email or web address]. By joining [employer]’s FollowMyHealth portal, you will also be able to view your health information using other applications (apps) compatible with our system.

## 2023-02-28 NOTE — ED ADULT NURSE NOTE - OBJECTIVE STATEMENT
Pt presents to ED c/o headache. Pt with h/o hypertension and is compliant on medication. Denies any chest pain.

## 2023-03-01 PROBLEM — I10 ESSENTIAL (PRIMARY) HYPERTENSION: Chronic | Status: ACTIVE | Noted: 2022-10-03

## 2023-03-29 ENCOUNTER — EMERGENCY (EMERGENCY)
Facility: HOSPITAL | Age: 63
LOS: 0 days | Discharge: HOME | End: 2023-03-30
Payer: MEDICAID

## 2023-03-29 DIAGNOSIS — M54.50 LOW BACK PAIN, UNSPECIFIED: ICD-10-CM

## 2023-03-29 DIAGNOSIS — I10 ESSENTIAL (PRIMARY) HYPERTENSION: ICD-10-CM

## 2023-03-29 DIAGNOSIS — M25.521 PAIN IN RIGHT ELBOW: ICD-10-CM

## 2023-03-29 PROCEDURE — 99283 EMERGENCY DEPT VISIT LOW MDM: CPT | Mod: 25

## 2023-03-29 PROCEDURE — 99284 EMERGENCY DEPT VISIT MOD MDM: CPT

## 2023-03-29 PROCEDURE — 73070 X-RAY EXAM OF ELBOW: CPT | Mod: RT

## 2023-03-29 PROCEDURE — 73070 X-RAY EXAM OF ELBOW: CPT | Mod: 26,RT

## 2024-10-02 ENCOUNTER — NON-APPOINTMENT (OUTPATIENT)
Age: 64
End: 2024-10-02

## 2024-10-03 ENCOUNTER — EMERGENCY (EMERGENCY)
Facility: HOSPITAL | Age: 64
LOS: 0 days | Discharge: HOME | End: 2024-10-04
Attending: EMERGENCY MEDICINE
Payer: MEDICAID

## 2024-10-03 VITALS
RESPIRATION RATE: 18 BRPM | TEMPERATURE: 98 F | WEIGHT: 143.3 LBS | DIASTOLIC BLOOD PRESSURE: 106 MMHG | HEART RATE: 66 BPM | SYSTOLIC BLOOD PRESSURE: 172 MMHG | OXYGEN SATURATION: 100 %

## 2024-10-03 LAB
ALBUMIN SERPL ELPH-MCNC: 4.9 G/DL — SIGNIFICANT CHANGE UP (ref 3.5–5.2)
ALP SERPL-CCNC: 72 U/L — SIGNIFICANT CHANGE UP (ref 30–115)
ALT FLD-CCNC: 20 U/L — SIGNIFICANT CHANGE UP (ref 0–41)
ANION GAP SERPL CALC-SCNC: 10 MMOL/L — SIGNIFICANT CHANGE UP (ref 7–14)
AST SERPL-CCNC: 18 U/L — SIGNIFICANT CHANGE UP (ref 0–41)
BASOPHILS # BLD AUTO: 0.02 K/UL — SIGNIFICANT CHANGE UP (ref 0–0.2)
BASOPHILS NFR BLD AUTO: 0.3 % — SIGNIFICANT CHANGE UP (ref 0–1)
BILIRUB SERPL-MCNC: 0.6 MG/DL — SIGNIFICANT CHANGE UP (ref 0.2–1.2)
BUN SERPL-MCNC: 12 MG/DL — SIGNIFICANT CHANGE UP (ref 10–20)
CALCIUM SERPL-MCNC: 9.4 MG/DL — SIGNIFICANT CHANGE UP (ref 8.4–10.5)
CHLORIDE SERPL-SCNC: 99 MMOL/L — SIGNIFICANT CHANGE UP (ref 98–110)
CO2 SERPL-SCNC: 30 MMOL/L — SIGNIFICANT CHANGE UP (ref 17–32)
CREAT SERPL-MCNC: 1.1 MG/DL — SIGNIFICANT CHANGE UP (ref 0.7–1.5)
EGFR: 75 ML/MIN/1.73M2 — SIGNIFICANT CHANGE UP
EOSINOPHIL # BLD AUTO: 0.04 K/UL — SIGNIFICANT CHANGE UP (ref 0–0.7)
EOSINOPHIL NFR BLD AUTO: 0.7 % — SIGNIFICANT CHANGE UP (ref 0–8)
GLUCOSE SERPL-MCNC: 93 MG/DL — SIGNIFICANT CHANGE UP (ref 70–99)
HCT VFR BLD CALC: 52.4 % — HIGH (ref 42–52)
HGB BLD-MCNC: 18.4 G/DL — HIGH (ref 14–18)
IMM GRANULOCYTES NFR BLD AUTO: 0.5 % — HIGH (ref 0.1–0.3)
LYMPHOCYTES # BLD AUTO: 0.91 K/UL — LOW (ref 1.2–3.4)
LYMPHOCYTES # BLD AUTO: 15 % — LOW (ref 20.5–51.1)
MCHC RBC-ENTMCNC: 34.2 PG — HIGH (ref 27–31)
MCHC RBC-ENTMCNC: 35.1 G/DL — SIGNIFICANT CHANGE UP (ref 32–37)
MCV RBC AUTO: 97.4 FL — HIGH (ref 80–94)
MONOCYTES # BLD AUTO: 0.4 K/UL — SIGNIFICANT CHANGE UP (ref 0.1–0.6)
MONOCYTES NFR BLD AUTO: 6.6 % — SIGNIFICANT CHANGE UP (ref 1.7–9.3)
NEUTROPHILS # BLD AUTO: 4.67 K/UL — SIGNIFICANT CHANGE UP (ref 1.4–6.5)
NEUTROPHILS NFR BLD AUTO: 76.9 % — HIGH (ref 42.2–75.2)
NRBC # BLD: 0 /100 WBCS — SIGNIFICANT CHANGE UP (ref 0–0)
PLATELET # BLD AUTO: 183 K/UL — SIGNIFICANT CHANGE UP (ref 130–400)
PMV BLD: 10.2 FL — SIGNIFICANT CHANGE UP (ref 7.4–10.4)
POTASSIUM SERPL-MCNC: 4 MMOL/L — SIGNIFICANT CHANGE UP (ref 3.5–5)
POTASSIUM SERPL-SCNC: 4 MMOL/L — SIGNIFICANT CHANGE UP (ref 3.5–5)
PROT SERPL-MCNC: 6.8 G/DL — SIGNIFICANT CHANGE UP (ref 6–8)
RBC # BLD: 5.38 M/UL — SIGNIFICANT CHANGE UP (ref 4.7–6.1)
RBC # FLD: 12.8 % — SIGNIFICANT CHANGE UP (ref 11.5–14.5)
SODIUM SERPL-SCNC: 139 MMOL/L — SIGNIFICANT CHANGE UP (ref 135–146)
TROPONIN T, HIGH SENSITIVITY RESULT: 7 NG/L — SIGNIFICANT CHANGE UP (ref 6–21)
TROPONIN T, HIGH SENSITIVITY RESULT: 9 NG/L — SIGNIFICANT CHANGE UP (ref 6–21)
WBC # BLD: 6.07 K/UL — SIGNIFICANT CHANGE UP (ref 4.8–10.8)
WBC # FLD AUTO: 6.07 K/UL — SIGNIFICANT CHANGE UP (ref 4.8–10.8)

## 2024-10-03 PROCEDURE — G0378: CPT

## 2024-10-03 PROCEDURE — 93308 TTE F-UP OR LMTD: CPT

## 2024-10-03 PROCEDURE — 36415 COLL VENOUS BLD VENIPUNCTURE: CPT

## 2024-10-03 PROCEDURE — 93308 TTE F-UP OR LMTD: CPT | Mod: 26

## 2024-10-03 PROCEDURE — 84484 ASSAY OF TROPONIN QUANT: CPT

## 2024-10-03 PROCEDURE — 71045 X-RAY EXAM CHEST 1 VIEW: CPT | Mod: 26

## 2024-10-03 PROCEDURE — 80053 COMPREHEN METABOLIC PANEL: CPT

## 2024-10-03 PROCEDURE — 93010 ELECTROCARDIOGRAM REPORT: CPT

## 2024-10-03 PROCEDURE — 99285 EMERGENCY DEPT VISIT HI MDM: CPT | Mod: 25

## 2024-10-03 PROCEDURE — 75574 CT ANGIO HRT W/3D IMAGE: CPT | Mod: MC

## 2024-10-03 PROCEDURE — 99223 1ST HOSP IP/OBS HIGH 75: CPT

## 2024-10-03 PROCEDURE — 93005 ELECTROCARDIOGRAM TRACING: CPT

## 2024-10-03 PROCEDURE — 93010 ELECTROCARDIOGRAM REPORT: CPT | Mod: 77

## 2024-10-03 PROCEDURE — 85025 COMPLETE CBC W/AUTO DIFF WBC: CPT

## 2024-10-03 PROCEDURE — 71045 X-RAY EXAM CHEST 1 VIEW: CPT

## 2024-10-03 RX ORDER — ASPIRIN 81 MG
324 TABLET, DELAYED RELEASE (ENTERIC COATED) ORAL ONCE
Refills: 0 | Status: COMPLETED | OUTPATIENT
Start: 2024-10-03 | End: 2024-10-03

## 2024-10-03 RX ORDER — LOSARTAN POTASSIUM 50 MG/1
100 TABLET ORAL DAILY
Refills: 0 | Status: DISCONTINUED | OUTPATIENT
Start: 2024-10-03 | End: 2024-10-04

## 2024-10-03 RX ADMIN — Medication 324 MILLIGRAM(S): at 17:40

## 2024-10-03 NOTE — ED CDU PROVIDER INITIAL DAY NOTE - OBJECTIVE STATEMENT
63-year-old male history of hypertension otherwise healthy, never smoker, no family history CAD presenting for evaluation of intermittent left-sided chest pain for the past 2 weeks.  No known exacerbating or alleviating factors.  This morning presenting as he noted " numbness" to bilateral chest wall.  No other acute complaints.  Pain nonradiating.  No fevers chills, shortness of breath.  No DVT or PE risk factors.  No history of previous cardiac workup

## 2024-10-03 NOTE — ED ADULT TRIAGE NOTE - LOCATION:
Keiko Patterson  MR number: [id number]  Account number: [de-identified]  : 1947  Age: 70 years  Gender: Female  Status: Inpatient  Location: Bedside  Height: 64 in  Weight: 347 4 lb  BP: 129/ 64 mmHg     Indications: Atrial fibrillation      Diagnoses: I48 0 - Atrial fibrillation     Sonographer:  Khadijah Burton RDCS  Primary Physician:  Brooks Mccain MD  Referring Physician:  Abdirahman Hall PA-C  Group:  [facility]  Interpreting Physician:  Gomez Marroquin MD     SUMMARY     LEFT VENTRICLE:  Systolic function was normal  Ejection fraction was estimated to be 60 %  There were no regional wall motion abnormalities  Wall thickness was mildly increased      LEFT ATRIUM:  The atrium was mildly dilated      RIGHT ATRIUM:  The atrium was mildly dilated      MITRAL VALVE:  There was mild to moderate regurgitation      TRICUSPID VALVE:  There was mild to moderate regurgitation  Pulmonary artery systolic pressure was mildly to moderately increased      PULMONIC VALVE:  There was trace regurgitation      AORTA:  There was mild dilatation of the ascending aorta  3 9 cm     HISTORY: PRIOR HISTORY: DM2  Morbid obesity  Hypertension  Atrial fibrillation      PROCEDURE: The procedure was performed at the bedside  This was a routine study  The transthoracic approach was used  The study included complete 2D imaging, M-mode, complete spectral Doppler, and color Doppler  The heart rate was 102 bpm,  at the start of the study  Echocardiographic views were limited due to restricted patient mobility and decreased penetration   Image quality was adequate      LEFT VENTRICLE: Size was normal  Systolic function was normal  Ejection fraction was estimated to be 60 %  There were no regional wall motion abnormalities  Wall thickness was mildly increased  No evidence of apical thrombus  DOPPLER: Left  ventricular diastolic function parameters were normal      RIGHT VENTRICLE: The size was normal  Systolic function was normal  Wall thickness was normal      LEFT ATRIUM: The atrium was mildly dilated      RIGHT ATRIUM: The atrium was mildly dilated      MITRAL VALVE: Valve structure was normal  There was mild thickening  There was normal leaflet separation  DOPPLER: The transmitral velocity was within the normal range  There was no evidence for stenosis  There was mild to moderate  regurgitation      AORTIC VALVE: The valve was trileaflet  Leaflets exhibited normal thickness and normal cuspal separation  DOPPLER: Transaortic velocity was within the normal range  There was no evidence for stenosis  There was no significant  regurgitation      TRICUSPID VALVE: The valve structure was normal  There was normal leaflet separation  DOPPLER: The transtricuspid velocity was within the normal range  There was no evidence for stenosis  There was mild to moderate regurgitation  Pulmonary  artery systolic pressure was mildly to moderately increased      PULMONIC VALVE: Leaflets exhibited normal thickness, no calcification, and normal cuspal separation  DOPPLER: The transpulmonic velocity was within the normal range  There was trace regurgitation      PERICARDIUM: There was no pericardial effusion  The pericardium was normal in appearance      AORTA: The root exhibited normal size  There was mild dilatation of the ascending aorta   3 9 cm     SYSTEMIC VEINS: IVC: The inferior vena cava was normal in size,  Pulmonary  Shortness of breath,        GI/Hepatic  Negative GI/hepatic ROS          Negative  ROS        Endo/Other  Diabetes ,      GYN       Hematology    Coagulation disorder currently taking oral anticoagulants,    Musculoskeletal       Neurology  Negative neurology ROS      Psychology   Negative psychology ROS              Physical Exam    Airway    Mallampati score: II  TM Distance: >3 FB  Neck ROM: full     Dental       Cardiovascular  Rhythm: irregular, Rate: abnormal,     Pulmonary  Breath sounds clear to auscultation,     Other Findings        Anesthesia Plan  ASA Score- 3     Anesthesia Type- IV sedation with anesthesia with ASA Monitors  Additional Monitors:   Airway Plan:         Plan Factors-  Patient did not smoke on day of surgery  Induction- intravenous  Postoperative Plan-     Informed Consent- Anesthetic plan and risks discussed with patient  I personally reviewed this patient with the CRNA  Discussed and agreed on the Anesthesia Plan with the CRNA  Aditi Machado Left arm;

## 2024-10-03 NOTE — ED PROVIDER NOTE - CLINICAL SUMMARY MEDICAL DECISION MAKING FREE TEXT BOX
63-year-old male history of hypertension otherwise healthy, never smoker, no family history CAD presenting for evaluation of intermittent left-sided chest pain for the past 2 weeks.  No known exacerbating or alleviating factors.  This morning presenting as he noted " numbness" to bilateral chest wall.  No other acute complaints.  Pain nonradiating.  No fevers chills, shortness of breath.  No DVT or PE risk factors.  No history of previous cardiac workup. Labs EKG imaging reviewed.  OBS for further evaluation.

## 2024-10-03 NOTE — ED PROVIDER NOTE - PHYSICAL EXAMINATION
Constitutional: Well appearing. No acute distress. Non toxic.   Eyes: PERRLA. Extraocular movements intact, no entrapment. Conjunctiva normal.   ENT: No nasal discharge. Moist mucus membranes.  Neck: Supple, non tender, full range of motion.  CV: RRR no murmurs, rubs, or gallops. +S1S2.   Pulm: Clear to auscultation bilaterally. Normal work of breathing.  Abd: soft NT ND +BS.   Ext: Warm and well perfused x4, moving all extremities, no edema.   Psy: Cooperative, appropriate.   Skin: Warm, dry, no rash  Neuro: nonfocal

## 2024-10-03 NOTE — ED ADULT NURSE NOTE - NSFALLUNIVINTERV_ED_ALL_ED
Bed/Stretcher in lowest position, wheels locked, appropriate side rails in place/Call bell, personal items and telephone in reach/Instruct patient to call for assistance before getting out of bed/chair/stretcher/Non-slip footwear applied when patient is off stretcher/[place] to call system/Physically safe environment - no spills, clutter or unnecessary equipment/Purposeful proactive rounding/Room/bathroom lighting operational, light cord in reach

## 2024-10-03 NOTE — ED ADULT TRIAGE NOTE - CHIEF COMPLAINT QUOTE
Patient c/o generalized chest numbness & non radiating left sided chest pain x2 weeks. Denies SOB. EKG done in triage.

## 2024-10-04 VITALS
SYSTOLIC BLOOD PRESSURE: 153 MMHG | TEMPERATURE: 98 F | OXYGEN SATURATION: 99 % | DIASTOLIC BLOOD PRESSURE: 102 MMHG | HEART RATE: 52 BPM | RESPIRATION RATE: 18 BRPM

## 2024-10-04 PROCEDURE — 99239 HOSP IP/OBS DSCHRG MGMT >30: CPT

## 2024-10-04 PROCEDURE — 75574 CT ANGIO HRT W/3D IMAGE: CPT | Mod: 26,MC

## 2024-10-04 RX ADMIN — LOSARTAN POTASSIUM 100 MILLIGRAM(S): 50 TABLET ORAL at 06:55

## 2024-10-04 NOTE — ED CDU PROVIDER DISPOSITION NOTE - PATIENT PORTAL LINK FT
You can access the FollowMyHealth Patient Portal offered by [facility] by registering at the following website: [email or web address]. By joining [employer]’s FollowMyHealth portal, you will also be able to view your health information using other applications (apps) compatible with our system.

## 2024-10-04 NOTE — ED ADULT NURSE REASSESSMENT NOTE - NS ED NURSE REASSESS COMMENT FT1
Pt reassessed A/O times 4 Vs stable report felling slight better ambulate steady CTA order is done completed ,pt is seen evaluate by ED attending  clear to go home NAD noted ready to bi  home with privet transport .

## 2024-10-04 NOTE — ED CDU PROVIDER DISPOSITION NOTE - NSDCPRINTRESULTS_ED_ALL_ED
Final Anesthesia Post-op Assessment    Patient: Kulwant Alcaraz  Procedure(s) Performed: (OA) COLONOSCOPYCOLONOSCOPY WITH ENDOSCOPIC MUCOSAL RESECTION  Anesthesia type: Monitor Anesthesia Care    Vitals Value Taken Time        Pulse 70 4/26/2019 10:46 AM   Resp 16 4/26/2019 10:46 AM   /74 4/26/2019 10:46 AM   SpO2 97 % 4/26/2019 10:46 AM       Last 24 I/O:     Intake/Output Summary (Last 24 hours) at 4/26/2019 1444  Last data filed at 4/26/2019 1017  Gross per 24 hour   Intake 1000 ml   Output --   Net 1000 ml       PATIENT LOCATION: Phase II  POST-OP VITAL SIGNS: stable  LEVEL OF CONSCIOUSNESS: awake, alert, answers questions appropriately and participates in exam  RESPIRATORY STATUS: unassisted and spontaneous ventilation  CARDIOVASCULAR: blood pressure returned to baseline and stable  HYDRATION: euvolemic    PAIN MANAGEMENT: well controlled  NAUSEA: None  AIRWAY PATENCY: patent  POST-OP ASSESSMENT: no complications, patient tolerated procedure well with no complications, no evidence of recall and sufficiently recovered from acute administration of anesthesia effects and able to participate in evaluation  COMPLICATIONS: none  Comments: Patient reassessed and appropriate for discharge       Patient requests all Lab, Cardiology, and Radiology Results on their Discharge Instructions

## 2024-10-04 NOTE — ED CDU PROVIDER SUBSEQUENT DAY NOTE - PHYSICAL EXAMINATION
GENERAL: Well-nourished, Well-developed. NAD.  HEAD: No visible or palpable bumps or hematomas. No ecchymosis behind ears B/L.  CVS: Normal S1,S2. No murmurs appreciated on auscultation   RESP: No use of accessory muscles. Chest rise symmetrical with good expansion. Lungs clear to auscultation B/L. No wheezing, rales, or rhonchi auscultated.  Skin: Warm, Dry. No rashes or lesions. Good cap refill < 2 sec B/L.

## 2024-10-04 NOTE — ED CDU PROVIDER DISPOSITION NOTE - CLINICAL COURSE
63-year-old man, history of hypertension was placed in CDU for evaluation of intermittent left-sided chest discomfort over the last couple weeks without associated symptoms.  ED workup unremarkable.  Patient is asymptomatic on my eval.  Vital signs, exam as noted.  CCTA with CAD RADS 0 for normal coronary arteries.  Results were discussed with patient and he was discharged to follow-up PMD.

## 2024-10-04 NOTE — ED CDU PROVIDER SUBSEQUENT DAY NOTE - CLINICAL SUMMARY MEDICAL DECISION MAKING FREE TEXT BOX
63-year-old man, history of hypertension was placed in CDU for evaluation of intermittent left-sided chest discomfort over the last couple weeks without associated symptoms.  ED workup unremarkable.  Patient is asymptomatic on my eval.  Vital signs, exam as noted, plan is for CCTA.

## 2024-10-09 ENCOUNTER — NON-APPOINTMENT (OUTPATIENT)
Age: 64
End: 2024-10-09

## 2025-03-08 ENCOUNTER — INPATIENT (INPATIENT)
Facility: HOSPITAL | Age: 65
LOS: 2 days | Discharge: HOME | DRG: 198 | End: 2025-03-11
Attending: INTERNAL MEDICINE | Admitting: STUDENT IN AN ORGANIZED HEALTH CARE EDUCATION/TRAINING PROGRAM
Payer: MEDICAID

## 2025-03-08 VITALS
DIASTOLIC BLOOD PRESSURE: 116 MMHG | TEMPERATURE: 98 F | WEIGHT: 160.06 LBS | HEART RATE: 93 BPM | RESPIRATION RATE: 18 BRPM | SYSTOLIC BLOOD PRESSURE: 195 MMHG | OXYGEN SATURATION: 96 % | HEIGHT: 64 IN

## 2025-03-08 DIAGNOSIS — I20.9 ANGINA PECTORIS, UNSPECIFIED: ICD-10-CM

## 2025-03-08 LAB
ALBUMIN SERPL ELPH-MCNC: 4.2 G/DL — SIGNIFICANT CHANGE UP (ref 3.5–5.2)
ALP SERPL-CCNC: 78 U/L — SIGNIFICANT CHANGE UP (ref 30–115)
ALT FLD-CCNC: 18 U/L — SIGNIFICANT CHANGE UP (ref 0–41)
ANION GAP SERPL CALC-SCNC: 8 MMOL/L — SIGNIFICANT CHANGE UP (ref 7–14)
AST SERPL-CCNC: 26 U/L — SIGNIFICANT CHANGE UP (ref 0–41)
BASOPHILS # BLD AUTO: 0.02 K/UL — SIGNIFICANT CHANGE UP (ref 0–0.2)
BASOPHILS NFR BLD AUTO: 0.3 % — SIGNIFICANT CHANGE UP (ref 0–1)
BILIRUB SERPL-MCNC: 0.4 MG/DL — SIGNIFICANT CHANGE UP (ref 0.2–1.2)
BUN SERPL-MCNC: 8 MG/DL — LOW (ref 10–20)
CALCIUM SERPL-MCNC: 8.5 MG/DL — SIGNIFICANT CHANGE UP (ref 8.4–10.5)
CHLORIDE SERPL-SCNC: 107 MMOL/L — SIGNIFICANT CHANGE UP (ref 98–110)
CO2 SERPL-SCNC: 25 MMOL/L — SIGNIFICANT CHANGE UP (ref 17–32)
CREAT SERPL-MCNC: 1.1 MG/DL — SIGNIFICANT CHANGE UP (ref 0.7–1.5)
EGFR: 75 ML/MIN/1.73M2 — SIGNIFICANT CHANGE UP
EGFR: 75 ML/MIN/1.73M2 — SIGNIFICANT CHANGE UP
EOSINOPHIL # BLD AUTO: 0.24 K/UL — SIGNIFICANT CHANGE UP (ref 0–0.7)
EOSINOPHIL NFR BLD AUTO: 4.1 % — SIGNIFICANT CHANGE UP (ref 0–8)
GLUCOSE SERPL-MCNC: 94 MG/DL — SIGNIFICANT CHANGE UP (ref 70–99)
HCT VFR BLD CALC: 46.5 % — SIGNIFICANT CHANGE UP (ref 42–52)
HGB BLD-MCNC: 15.7 G/DL — SIGNIFICANT CHANGE UP (ref 14–18)
IMM GRANULOCYTES NFR BLD AUTO: 0.2 % — SIGNIFICANT CHANGE UP (ref 0.1–0.3)
LYMPHOCYTES # BLD AUTO: 0.7 K/UL — LOW (ref 1.2–3.4)
LYMPHOCYTES # BLD AUTO: 11.8 % — LOW (ref 20.5–51.1)
MCHC RBC-ENTMCNC: 32.2 PG — HIGH (ref 27–31)
MCHC RBC-ENTMCNC: 33.8 G/DL — SIGNIFICANT CHANGE UP (ref 32–37)
MCV RBC AUTO: 95.3 FL — HIGH (ref 80–94)
MONOCYTES # BLD AUTO: 0.42 K/UL — SIGNIFICANT CHANGE UP (ref 0.1–0.6)
MONOCYTES NFR BLD AUTO: 7.1 % — SIGNIFICANT CHANGE UP (ref 1.7–9.3)
NEUTROPHILS # BLD AUTO: 4.52 K/UL — SIGNIFICANT CHANGE UP (ref 1.4–6.5)
NEUTROPHILS NFR BLD AUTO: 76.5 % — HIGH (ref 42.2–75.2)
NRBC BLD AUTO-RTO: 0 /100 WBCS — SIGNIFICANT CHANGE UP (ref 0–0)
PLATELET # BLD AUTO: 148 K/UL — SIGNIFICANT CHANGE UP (ref 130–400)
PMV BLD: 10.5 FL — HIGH (ref 7.4–10.4)
POTASSIUM SERPL-MCNC: 4.8 MMOL/L — SIGNIFICANT CHANGE UP (ref 3.5–5)
POTASSIUM SERPL-SCNC: 4.8 MMOL/L — SIGNIFICANT CHANGE UP (ref 3.5–5)
PROT SERPL-MCNC: 5.8 G/DL — LOW (ref 6–8)
RBC # BLD: 4.88 M/UL — SIGNIFICANT CHANGE UP (ref 4.7–6.1)
RBC # FLD: 12.2 % — SIGNIFICANT CHANGE UP (ref 11.5–14.5)
SODIUM SERPL-SCNC: 140 MMOL/L — SIGNIFICANT CHANGE UP (ref 135–146)
TROPONIN T, HIGH SENSITIVITY RESULT: 12 NG/L — SIGNIFICANT CHANGE UP (ref 6–21)
TROPONIN T, HIGH SENSITIVITY RESULT: 15 NG/L — SIGNIFICANT CHANGE UP (ref 6–21)
WBC # BLD: 5.91 K/UL — SIGNIFICANT CHANGE UP (ref 4.8–10.8)
WBC # FLD AUTO: 5.91 K/UL — SIGNIFICANT CHANGE UP (ref 4.8–10.8)

## 2025-03-08 PROCEDURE — 83735 ASSAY OF MAGNESIUM: CPT

## 2025-03-08 PROCEDURE — 84484 ASSAY OF TROPONIN QUANT: CPT

## 2025-03-08 PROCEDURE — 93306 TTE W/DOPPLER COMPLETE: CPT

## 2025-03-08 PROCEDURE — 99222 1ST HOSP IP/OBS MODERATE 55: CPT

## 2025-03-08 PROCEDURE — 80053 COMPREHEN METABOLIC PANEL: CPT

## 2025-03-08 PROCEDURE — 36415 COLL VENOUS BLD VENIPUNCTURE: CPT

## 2025-03-08 PROCEDURE — T1013: CPT

## 2025-03-08 PROCEDURE — 93005 ELECTROCARDIOGRAM TRACING: CPT

## 2025-03-08 PROCEDURE — 99285 EMERGENCY DEPT VISIT HI MDM: CPT

## 2025-03-08 PROCEDURE — 71045 X-RAY EXAM CHEST 1 VIEW: CPT | Mod: 26

## 2025-03-08 PROCEDURE — 85025 COMPLETE CBC W/AUTO DIFF WBC: CPT

## 2025-03-08 PROCEDURE — 93010 ELECTROCARDIOGRAM REPORT: CPT

## 2025-03-08 RX ORDER — METOPROLOL SUCCINATE 50 MG/1
100 TABLET, EXTENDED RELEASE ORAL ONCE
Refills: 0 | Status: COMPLETED | OUTPATIENT
Start: 2025-03-08 | End: 2025-03-08

## 2025-03-08 RX ORDER — ENALAPRIL MALEATE 20 MG
2.5 TABLET ORAL ONCE
Refills: 0 | Status: COMPLETED | OUTPATIENT
Start: 2025-03-08 | End: 2025-03-08

## 2025-03-08 RX ADMIN — Medication 2.5 MILLIGRAM(S): at 19:37

## 2025-03-08 RX ADMIN — METOPROLOL SUCCINATE 100 MILLIGRAM(S): 50 TABLET, EXTENDED RELEASE ORAL at 19:37

## 2025-03-08 NOTE — H&P ADULT - NSICDXPASTMEDICALHX_GEN_ALL_CORE_FT
PAST MEDICAL HISTORY:  HTN (hypertension)      PAST MEDICAL HISTORY:  CAD S/P percutaneous coronary angioplasty     HTN (hypertension)     Hyperlipidemia

## 2025-03-08 NOTE — ED PROVIDER NOTE - CLINICAL SUMMARY MEDICAL DECISION MAKING FREE TEXT BOX
63yo man h/o HTN, HLD, CAD s/p ACS/stent at [facility] in January of 2025. Pt has been compliant with his asa and clopidogrel and has felt well until yesterday, when he began to have chest pressure/pain and palpitations similar to the symptoms he had before his stent. No SOB or sweating, but worse on exertion. He denies infectious or GI symptoms. On exam he is very hypertensive but nontoxic appearing, looks comfortable. Lungs CTA, CVS1S2, RRR, abd soft, NT, ND. No peripheral edema. Pt is on metoprolol, gave a dose of tartrate and enalapril as first trop negative and EKG with no acute ischemic changes; pt would benefit from further cardiac eval as coronary anatomy not currently known and no access to cath report at this time. 63yo man h/o HTN, HLD, CAD s/p ACS/stent at [facility] in January of 2025. Pt has been compliant with his asa and clopidogrel and has felt well until yesterday, when he began to have chest pressure/pain and palpitations similar to the symptoms he had before his stent. No SOB or sweating, but worse on exertion. He denies infectious or GI symptoms. On exam he is very hypertensive but nontoxic appearing, looks comfortable. Lungs CTA, CVS1S2, RRR, abd soft, NT, ND. No peripheral edema. Pt is on metoprolol, gave a dose of tartrate and enalapril as first trop negative and EKG with no acute ischemic changes; pt would benefit from further cardiac eval as he needs better bp control, current coronary anatomy not  known (?other, less severe stenosis that could be causing anginal pain) and no access to cath report at this time. 65yo man h/o HTN, HLD, CAD s/p ACS/stent at [facility] in January of 2025. Pt has been compliant with his asa and clopidogrel and has felt well until yesterday, when he began to have chest pressure/pain and palpitations similar to the symptoms he had before his stent. No SOB or sweating, but worse on exertion. He denies infectious or GI symptoms. On exam he is very hypertensive but nontoxic appearing, looks comfortable. Lungs CTA, CVS1S2, RRR, abd soft, NT, ND. No peripheral edema. Pt is on metoprolol, gave a dose of tartrate and enalapril as first trop negative and EKG with no acute ischemic changes; pt would benefit from further cardiac eval as he needs better bp control, current coronary anatomy not  known (?other, less severe stenosis that could be causing anginal pain) and no access to cath report at this time.    hx via [employer]  via [employer]

## 2025-03-08 NOTE — H&P ADULT - NSHPLABSRESULTS_GEN_ALL_CORE
Labs:                        15.7   5.91  )-----------( 148      ( 08 Mar 2025 16:21 )             46.5     03-08    140  |  107  |  8[L]  ----------------------------<  94  4.8   |  25  |  1.1    Ca    8.5      08 Mar 2025 16:21    TPro  5.8[L]  /  Alb  4.2  /  TBili  0.4  /  DBili  x   /  AST  26  /  ALT  18  /  AlkPhos  78  03-08      Creatinine: 1.1 mg/dL (03-08-25 @ 16:21)              WBC Count: 5.91 K/uL (03-08-25 @ 16:21)        Alkaline Phosphatase: 78 U/L (03-08-25 @ 16:21)  Alanine Aminotransferase (ALT/SGPT): 18 U/L (03-08-25 @ 16:21)  Aspartate Aminotransferase (AST/SGOT): 26 U/L (03-08-25 @ 16:21)  Bilirubin Total: 0.4 mg/dL (03-08-25 @ 16:21) Labs:                        15.7   5.91  )-----------( 148      ( 08 Mar 2025 16:21 )             46.5     03-08    140  |  107  |  8[L]  ----------------------------<  94  4.8   |  25  |  1.1    Ca    8.5      08 Mar 2025 16:21    TPro  5.8[L]  /  Alb  4.2  /  TBili  0.4  /  DBili  x   /  AST  26  /  ALT  18  /  AlkPhos  78  03-08      Creatinine: 1.1 mg/dL (03-08-25 @ 16:21)      Alkaline Phosphatase: 78 U/L (03-08-25 @ 16:21)  Alanine Aminotransferase (ALT/SGPT): 18 U/L (03-08-25 @ 16:21)  Aspartate Aminotransferase (AST/SGOT): 26 U/L (03-08-25 @ 16:21)  Bilirubin Total: 0.4 mg/dL (03-08-25 @ 16:21)      Troponin =  12 <---- 15< from: 12 Lead ECG (03.08.25 @ 15:06) >      EKG 03/08/25.    Diagnosis Line Normal sinus rhythm  Left axis deviation  Possible Anterior infarct , age undetermined  Abnormal ECG    < end of copied text >

## 2025-03-08 NOTE — ED PROVIDER NOTE - OBJECTIVE STATEMENT
Patient is a 64-year-old man with past medical history of hypertension, hyperlipidemia, ED status post stent at [facility] (01/2025) c/o chest pain.  Patient states that yesterday patient had left-sided chest pressure, palpitations, described as if someone was squeezing his chest, that felt similar to his previous ACS 2 months ago.  Patient denies any shortness of breath, diaphoresis, abdominal pain, syncope.  Patient takes aspirin and clopidogrel, compliant with both. Pt does not have access to previous [facility] report.

## 2025-03-08 NOTE — H&P ADULT - ATTENDING COMMENTS
Patient seen on 03/08/25.        64 years old male with hypertension; hyperlipidemia  presented to the ED for chest pain.    # Non Cardiac Chest Pain: Dx : Costochondritis   # NSTEMI OM2 100% occlusion s/p ALESHA ( Jan 2025)   # Hypertensive urgency:   # Hypertension:   # Hyperlipidemia:   - complains of substernal chest pain ; non-radiating; constant; no aggravating or relieving factors ; Pain on PALPATION   - EKG : Non ischemic   - Troponin x2 : Flat  - OM2 occlusion s/p PCI   - Continue Aspirin and Plavix   - BP trending down ; s/p Enalapril injection and metoprolol >> Continue home medication Losartan 100 mg ; Metoprolol succinate 25 mg OD.  - Continue Atorvastatin 80 mg OD>   - Toradol PRN x 1 dose if needed; Reassess pain in the AM   - repeat EKG AM   - Monitor BP     # MISC:   - DVT: Heparin ppx   - GI : ppz   - Diet : DASH/TLC   - Activity ; Ambulate as tolerated

## 2025-03-08 NOTE — H&P ADULT - TIME BILLING
55 minutes spent to complete patient's bedside assessment, review medical chart, discuss medical plan of care with covering medical team with >50% of time spent face to face with patient, discussion with patient/family and/or coordination of care.

## 2025-03-08 NOTE — ED ADULT NURSE NOTE - NSFALLUNIVINTERV_ED_ALL_ED
Bed/Stretcher in lowest position, wheels locked, appropriate side rails in place/Call bell, personal items and telephone in reach/Instruct patient to call for assistance before getting out of bed/chair/stretcher/Non-slip footwear applied when patient is off stretcher/[place] to call system/Physically safe environment - no spills, clutter or unnecessary equipment/Purposeful proactive rounding/Room/bathroom lighting operational, light cord in reach

## 2025-03-08 NOTE — H&P ADULT - ASSESSMENT
64 years old male with hypertension; hyperlipidemia  presented to the ED for chest pain     # Chest Pain:   # CAD   # Hypertensive urgency:   # Hypertension:   # Hyperlipidemia:   - complians of   - EKG : Non ischemic   - Troponin x2 : Flat  -         # MISC:   - DVT: Heparin ppx   - GI : Not indicated   - Diet : DASH/TLC   - Activity ; Ambulate as tolerated        64 years old male with hypertension; hyperlipidemia  presented to the ED for chest pain     # Non Cardiac Chest Pain: Dx : Costochondritis   # NSTEMI OM2 100% occlusion s/p ALESHA ( Jan 2025)   # Hypertensive urgency:   # Hypertension:   # Hyperlipidemia:   - complians of substernal chest pain ; nonradiating ; constant; no aggravating or relieving factors ; Pain on PALPATION   - EKG : Non ischemic   - Troponin x2 : Flat  - OM2 occlusion s/p PCI   - Continue Aspirin and Plavix   - BP trending down ; s/p Enalapril injection and metoprolol >> Continue home medication Losartan 100 mg ; MEto succinate 25 mg OD.   - Continue Atorvastatin 80 mg OD>   - Toradol PRN x 1 dose if needed; Reassess pain in the AM   - EKG AM   - Monitor BP       # MISC:   - DVT: Heparin ppx   - GI : Not indicated   - Diet : DASH/TLC   - Activity ; Ambulate as tolerated     Disp: ADmit to medicine     Pending :  Clinical Improvement.        64 years old male with hypertension; hyperlipidemia  presented to the ED for chest pain.    # Non Cardiac Chest Pain: Dx : Costochondritis   # NSTEMI OM2 100% occlusion s/p ALESHA ( Jan 2025)   # Hypertensive urgency:   # Hypertension:   # Hyperlipidemia:   - complains of substernal chest pain ; non-radiating; constant; no aggravating or relieving factors ; Pain on PALPATION   - EKG : Non ischemic   - Troponin x2 : Flat  - OM2 occlusion s/p PCI   - Continue Aspirin and Plavix   - BP trending down ; s/p Enalapril injection and metoprolol >> Continue home medication Losartan 100 mg ; Metoprolol succinate 25 mg OD.  - Continue Atorvastatin 80 mg OD>   - Toradol PRN x 1 dose if needed; Reassess pain in the AM   - repeat EKG AM   - Monitor BP     # MISC:   - DVT: Heparin ppx   - GI : ppz   - Diet : DASH/TLC   - Activity ; Ambulate as tolerated     Disp: Admit to medicine     Pending :  Clinical Improvement.

## 2025-03-08 NOTE — H&P ADULT - HISTORY OF PRESENT ILLNESS
Incomplete note:     64-year-old man with past medical history of hypertension, hyperlipidemia, ED status post stent at [facility] (01/2025) c/o chest pain.  Patient states that yesterday patient had left-sided chest pressure, palpitations, described as if someone was squeezing his chest, that felt similar to his previous ACS 2 months ago.  Patient denies any shortness of breath, diaphoresis, abdominal pain, syncope.  Patient takes aspirin and clopidogrel, compliant with both. Pt does not have access to previous [facility] report    In the ED;   Vitals : /116 mmHg; HR 93 ;  RR 18; 96% on RA.     Labs:  WBC 5.91 K Hb 15.7 MCV 95.3 Platelets 148 K   Na 140 K 4.8   BUN/Creatinine 8/1.1   LFT WNL     Troponin 15>> 12   EKG: No ischemic changes      s/p  Enalaprilat IV push; Metoprolol 100     Admitted to medicine for chest pain .    64-year-old man with past medical history of hypertension, hyperlipidemia, ED status post stent at [facility] (01/2025) c/o chest pain.  Patient states that yesterday patient had left-sided chest pressure, acute , non radiating , no aggravating or relieving factors, constant in nature.   Recently was admitted at [facility] for 100% occlusion proximal OM2 s/p ALESHA.   Was discharged on Aspirin and Plavix.       In the ED;   Vitals : /116 mmHg; HR 93 ;  RR 18; 96% on RA.     Labs:  WBC 5.91 K Hb 15.7 MCV 95.3 Platelets 148 K   Na 140 K 4.8   BUN/Creatinine 8/1.1   LFT WNL     Troponin 15>> 12   EKG: No ischemic changes      s/p  Enalaprilat IV push; Metoprolol 100     Admitted to medicine for chest pain .

## 2025-03-08 NOTE — ED ADULT TRIAGE NOTE - CHIEF COMPLAINT QUOTE
pt reports he feels like his heart is beating fast and c/o headache and nausea. pt states he had a stent placed 1 month ago

## 2025-03-08 NOTE — H&P ADULT - NSHPPHYSICALEXAM_GEN_ALL_CORE
CONSTITUTIONAL:  NAD ;   EYES: PERRLA and symmetric, EOMI,   ENMT: Oral mucosa with moist membranes.   NECK: Supple  RESP:   CV:   GI: Soft, Non tender ; BS present.   MSK: No cyanosis; no clubbing   NEURO: Grossly Intact CONSTITUTIONAL:  NAD ;   EYES: PERRLA and symmetric, EOMI,   ENMT: Oral mucosa with moist membranes.   NECK: Supple  RESP: Bilateral equal air entry ; Normal vesicular breath sounds.  CV: S1S2 ; no murmur ; No pitting edema   GI: Soft, Non tender ; BS present.   MSK: No cyanosis; no clubbing   NEURO: Grossly Intact CONSTITUTIONAL:  NAD ;   EYES: PERRLA and symmetric, EOMI,   ENMT: Oral mucosa with moist membranes.   NECK: Supple  RESP: Bilateral equal air entry ; Normal vesicular breath sounds.  CV: S1S2 ; no murmur ; No pitting edema,  chest pain reproduced on palpation.  GI: Soft, Non tender ; BS present.   MSK: No cyanosis; no clubbing   NEURO: Grossly Intact

## 2025-03-08 NOTE — ED PROVIDER NOTE - PHYSICAL EXAMINATION
VITAL SIGNS: I have reviewed nursing notes and confirm.  CONSTITUTIONAL: Well-developed; well-nourished; in no acute distress.  SKIN: Skin exam is warm and dry, no acute rash.  HEAD: Normocephalic; atraumatic.  EYES:  conjunctiva and sclera clear.  NECK: Supple; non tender.  CARD: S1, S2 normal; no murmurs, gallops, or rubs. Regular rate and rhythm.  RESP: Normal respiratory effort, no tachypnea or distress. Lungs CTAB, no wheezes, rales or rhonchi.  ABD: soft, NT/ND.  EXT: Normal ROM. No clubbing, cyanosis or edema.  NEURO: Alert, oriented. Grossly unremarkable. No focal deficits.  PSYCH: Cooperative, appropriate.

## 2025-03-09 LAB
ALBUMIN SERPL ELPH-MCNC: 4 G/DL — SIGNIFICANT CHANGE UP (ref 3.5–5.2)
ALP SERPL-CCNC: 68 U/L — SIGNIFICANT CHANGE UP (ref 30–115)
ALT FLD-CCNC: 15 U/L — SIGNIFICANT CHANGE UP (ref 0–41)
ANION GAP SERPL CALC-SCNC: 8 MMOL/L — SIGNIFICANT CHANGE UP (ref 7–14)
AST SERPL-CCNC: 14 U/L — SIGNIFICANT CHANGE UP (ref 0–41)
BASOPHILS # BLD AUTO: 0.02 K/UL — SIGNIFICANT CHANGE UP (ref 0–0.2)
BASOPHILS NFR BLD AUTO: 0.4 % — SIGNIFICANT CHANGE UP (ref 0–1)
BILIRUB SERPL-MCNC: 0.7 MG/DL — SIGNIFICANT CHANGE UP (ref 0.2–1.2)
BUN SERPL-MCNC: 8 MG/DL — LOW (ref 10–20)
CALCIUM SERPL-MCNC: 8.6 MG/DL — SIGNIFICANT CHANGE UP (ref 8.4–10.5)
CHLORIDE SERPL-SCNC: 106 MMOL/L — SIGNIFICANT CHANGE UP (ref 98–110)
CO2 SERPL-SCNC: 29 MMOL/L — SIGNIFICANT CHANGE UP (ref 17–32)
CREAT SERPL-MCNC: 1 MG/DL — SIGNIFICANT CHANGE UP (ref 0.7–1.5)
EGFR: 84 ML/MIN/1.73M2 — SIGNIFICANT CHANGE UP
EGFR: 84 ML/MIN/1.73M2 — SIGNIFICANT CHANGE UP
EOSINOPHIL # BLD AUTO: 0.31 K/UL — SIGNIFICANT CHANGE UP (ref 0–0.7)
EOSINOPHIL NFR BLD AUTO: 6 % — SIGNIFICANT CHANGE UP (ref 0–8)
GLUCOSE SERPL-MCNC: 96 MG/DL — SIGNIFICANT CHANGE UP (ref 70–99)
HCT VFR BLD CALC: 45.5 % — SIGNIFICANT CHANGE UP (ref 42–52)
HGB BLD-MCNC: 15.8 G/DL — SIGNIFICANT CHANGE UP (ref 14–18)
IMM GRANULOCYTES NFR BLD AUTO: 0.2 % — SIGNIFICANT CHANGE UP (ref 0.1–0.3)
LYMPHOCYTES # BLD AUTO: 0.86 K/UL — LOW (ref 1.2–3.4)
LYMPHOCYTES # BLD AUTO: 16.8 % — LOW (ref 20.5–51.1)
MAGNESIUM SERPL-MCNC: 1.8 MG/DL — SIGNIFICANT CHANGE UP (ref 1.8–2.4)
MCHC RBC-ENTMCNC: 33.2 PG — HIGH (ref 27–31)
MCHC RBC-ENTMCNC: 34.7 G/DL — SIGNIFICANT CHANGE UP (ref 32–37)
MCV RBC AUTO: 95.6 FL — HIGH (ref 80–94)
MONOCYTES # BLD AUTO: 0.38 K/UL — SIGNIFICANT CHANGE UP (ref 0.1–0.6)
MONOCYTES NFR BLD AUTO: 7.4 % — SIGNIFICANT CHANGE UP (ref 1.7–9.3)
NEUTROPHILS # BLD AUTO: 3.55 K/UL — SIGNIFICANT CHANGE UP (ref 1.4–6.5)
NEUTROPHILS NFR BLD AUTO: 69.2 % — SIGNIFICANT CHANGE UP (ref 42.2–75.2)
NRBC BLD AUTO-RTO: 0 /100 WBCS — SIGNIFICANT CHANGE UP (ref 0–0)
PLATELET # BLD AUTO: 157 K/UL — SIGNIFICANT CHANGE UP (ref 130–400)
PMV BLD: 10.8 FL — HIGH (ref 7.4–10.4)
POTASSIUM SERPL-MCNC: 3.8 MMOL/L — SIGNIFICANT CHANGE UP (ref 3.5–5)
POTASSIUM SERPL-SCNC: 3.8 MMOL/L — SIGNIFICANT CHANGE UP (ref 3.5–5)
PROT SERPL-MCNC: 5.5 G/DL — LOW (ref 6–8)
RBC # BLD: 4.76 M/UL — SIGNIFICANT CHANGE UP (ref 4.7–6.1)
RBC # FLD: 12.1 % — SIGNIFICANT CHANGE UP (ref 11.5–14.5)
SODIUM SERPL-SCNC: 143 MMOL/L — SIGNIFICANT CHANGE UP (ref 135–146)
TROPONIN T, HIGH SENSITIVITY RESULT: 13 NG/L — SIGNIFICANT CHANGE UP (ref 6–21)
WBC # BLD: 5.13 K/UL — SIGNIFICANT CHANGE UP (ref 4.8–10.8)
WBC # FLD AUTO: 5.13 K/UL — SIGNIFICANT CHANGE UP (ref 4.8–10.8)

## 2025-03-09 PROCEDURE — 93010 ELECTROCARDIOGRAM REPORT: CPT

## 2025-03-09 PROCEDURE — 99232 SBSQ HOSP IP/OBS MODERATE 35: CPT

## 2025-03-09 RX ORDER — ASPIRIN 325 MG
1 TABLET ORAL
Refills: 0 | DISCHARGE

## 2025-03-09 RX ORDER — ATORVASTATIN CALCIUM 80 MG/1
1 TABLET, FILM COATED ORAL
Refills: 0 | DISCHARGE

## 2025-03-09 RX ORDER — ASPIRIN 325 MG
81 TABLET ORAL DAILY
Refills: 0 | Status: DISCONTINUED | OUTPATIENT
Start: 2025-03-09 | End: 2025-03-11

## 2025-03-09 RX ORDER — HEPARIN SODIUM 1000 [USP'U]/ML
5000 INJECTION INTRAVENOUS; SUBCUTANEOUS EVERY 8 HOURS
Refills: 0 | Status: DISCONTINUED | OUTPATIENT
Start: 2025-03-09 | End: 2025-03-10

## 2025-03-09 RX ORDER — ATORVASTATIN CALCIUM 80 MG/1
80 TABLET, FILM COATED ORAL AT BEDTIME
Refills: 0 | Status: DISCONTINUED | OUTPATIENT
Start: 2025-03-09 | End: 2025-03-11

## 2025-03-09 RX ORDER — LOSARTAN POTASSIUM 100 MG/1
100 TABLET, FILM COATED ORAL DAILY
Refills: 0 | Status: DISCONTINUED | OUTPATIENT
Start: 2025-03-09 | End: 2025-03-11

## 2025-03-09 RX ORDER — CLOPIDOGREL BISULFATE 75 MG/1
75 TABLET, FILM COATED ORAL DAILY
Refills: 0 | Status: DISCONTINUED | OUTPATIENT
Start: 2025-03-09 | End: 2025-03-11

## 2025-03-09 RX ORDER — CLOPIDOGREL BISULFATE 75 MG/1
1 TABLET, FILM COATED ORAL
Refills: 0 | DISCHARGE

## 2025-03-09 RX ORDER — METOPROLOL SUCCINATE 50 MG/1
25 TABLET, EXTENDED RELEASE ORAL DAILY
Refills: 0 | Status: DISCONTINUED | OUTPATIENT
Start: 2025-03-09 | End: 2025-03-10

## 2025-03-09 RX ORDER — LOSARTAN POTASSIUM 100 MG/1
1 TABLET, FILM COATED ORAL
Refills: 0 | DISCHARGE

## 2025-03-09 RX ADMIN — HEPARIN SODIUM 5000 UNIT(S): 1000 INJECTION INTRAVENOUS; SUBCUTANEOUS at 21:27

## 2025-03-09 RX ADMIN — METOPROLOL SUCCINATE 25 MILLIGRAM(S): 50 TABLET, EXTENDED RELEASE ORAL at 05:44

## 2025-03-09 RX ADMIN — CLOPIDOGREL BISULFATE 75 MILLIGRAM(S): 75 TABLET, FILM COATED ORAL at 11:38

## 2025-03-09 RX ADMIN — Medication 81 MILLIGRAM(S): at 11:38

## 2025-03-09 RX ADMIN — Medication 40 MILLIGRAM(S): at 05:45

## 2025-03-09 RX ADMIN — HEPARIN SODIUM 5000 UNIT(S): 1000 INJECTION INTRAVENOUS; SUBCUTANEOUS at 05:45

## 2025-03-09 RX ADMIN — LOSARTAN POTASSIUM 100 MILLIGRAM(S): 100 TABLET, FILM COATED ORAL at 05:44

## 2025-03-09 RX ADMIN — HEPARIN SODIUM 5000 UNIT(S): 1000 INJECTION INTRAVENOUS; SUBCUTANEOUS at 13:09

## 2025-03-09 RX ADMIN — ATORVASTATIN CALCIUM 80 MILLIGRAM(S): 80 TABLET, FILM COATED ORAL at 21:28

## 2025-03-09 NOTE — PATIENT PROFILE ADULT - FALL HARM RISK - HARM RISK INTERVENTIONS

## 2025-03-10 ENCOUNTER — RESULT REVIEW (OUTPATIENT)
Age: 65
End: 2025-03-10

## 2025-03-10 ENCOUNTER — TRANSCRIPTION ENCOUNTER (OUTPATIENT)
Age: 65
End: 2025-03-10

## 2025-03-10 LAB
ALBUMIN SERPL ELPH-MCNC: 4.2 G/DL — SIGNIFICANT CHANGE UP (ref 3.5–5.2)
ALP SERPL-CCNC: 74 U/L — SIGNIFICANT CHANGE UP (ref 30–115)
ALT FLD-CCNC: 15 U/L — SIGNIFICANT CHANGE UP (ref 0–41)
ANION GAP SERPL CALC-SCNC: 11 MMOL/L — SIGNIFICANT CHANGE UP (ref 7–14)
AST SERPL-CCNC: 15 U/L — SIGNIFICANT CHANGE UP (ref 0–41)
BASOPHILS # BLD AUTO: 0.03 K/UL — SIGNIFICANT CHANGE UP (ref 0–0.2)
BASOPHILS NFR BLD AUTO: 0.6 % — SIGNIFICANT CHANGE UP (ref 0–1)
BILIRUB SERPL-MCNC: 0.6 MG/DL — SIGNIFICANT CHANGE UP (ref 0.2–1.2)
BUN SERPL-MCNC: 13 MG/DL — SIGNIFICANT CHANGE UP (ref 10–20)
CALCIUM SERPL-MCNC: 8.7 MG/DL — SIGNIFICANT CHANGE UP (ref 8.4–10.5)
CHLORIDE SERPL-SCNC: 106 MMOL/L — SIGNIFICANT CHANGE UP (ref 98–110)
CO2 SERPL-SCNC: 25 MMOL/L — SIGNIFICANT CHANGE UP (ref 17–32)
CREAT SERPL-MCNC: 1 MG/DL — SIGNIFICANT CHANGE UP (ref 0.7–1.5)
EGFR: 84 ML/MIN/1.73M2 — SIGNIFICANT CHANGE UP
EGFR: 84 ML/MIN/1.73M2 — SIGNIFICANT CHANGE UP
EOSINOPHIL # BLD AUTO: 0.32 K/UL — SIGNIFICANT CHANGE UP (ref 0–0.7)
EOSINOPHIL NFR BLD AUTO: 6.3 % — SIGNIFICANT CHANGE UP (ref 0–8)
GLUCOSE SERPL-MCNC: 104 MG/DL — HIGH (ref 70–99)
HCT VFR BLD CALC: 47.4 % — SIGNIFICANT CHANGE UP (ref 42–52)
HGB BLD-MCNC: 16.4 G/DL — SIGNIFICANT CHANGE UP (ref 14–18)
IMM GRANULOCYTES NFR BLD AUTO: 0.4 % — HIGH (ref 0.1–0.3)
LYMPHOCYTES # BLD AUTO: 0.95 K/UL — LOW (ref 1.2–3.4)
LYMPHOCYTES # BLD AUTO: 18.8 % — LOW (ref 20.5–51.1)
MAGNESIUM SERPL-MCNC: 1.9 MG/DL — SIGNIFICANT CHANGE UP (ref 1.8–2.4)
MCHC RBC-ENTMCNC: 33 PG — HIGH (ref 27–31)
MCHC RBC-ENTMCNC: 34.6 G/DL — SIGNIFICANT CHANGE UP (ref 32–37)
MCV RBC AUTO: 95.4 FL — HIGH (ref 80–94)
MONOCYTES # BLD AUTO: 0.33 K/UL — SIGNIFICANT CHANGE UP (ref 0.1–0.6)
MONOCYTES NFR BLD AUTO: 6.5 % — SIGNIFICANT CHANGE UP (ref 1.7–9.3)
NEUTROPHILS # BLD AUTO: 3.4 K/UL — SIGNIFICANT CHANGE UP (ref 1.4–6.5)
NEUTROPHILS NFR BLD AUTO: 67.4 % — SIGNIFICANT CHANGE UP (ref 42.2–75.2)
NRBC BLD AUTO-RTO: 0 /100 WBCS — SIGNIFICANT CHANGE UP (ref 0–0)
PLATELET # BLD AUTO: 147 K/UL — SIGNIFICANT CHANGE UP (ref 130–400)
PMV BLD: 10.9 FL — HIGH (ref 7.4–10.4)
POTASSIUM SERPL-MCNC: 3.7 MMOL/L — SIGNIFICANT CHANGE UP (ref 3.5–5)
POTASSIUM SERPL-SCNC: 3.7 MMOL/L — SIGNIFICANT CHANGE UP (ref 3.5–5)
PROT SERPL-MCNC: 5.7 G/DL — LOW (ref 6–8)
RBC # BLD: 4.97 M/UL — SIGNIFICANT CHANGE UP (ref 4.7–6.1)
RBC # FLD: 12.2 % — SIGNIFICANT CHANGE UP (ref 11.5–14.5)
SODIUM SERPL-SCNC: 142 MMOL/L — SIGNIFICANT CHANGE UP (ref 135–146)
WBC # BLD: 5.05 K/UL — SIGNIFICANT CHANGE UP (ref 4.8–10.8)
WBC # FLD AUTO: 5.05 K/UL — SIGNIFICANT CHANGE UP (ref 4.8–10.8)

## 2025-03-10 PROCEDURE — 93306 TTE W/DOPPLER COMPLETE: CPT | Mod: 26

## 2025-03-10 PROCEDURE — 99252 IP/OBS CONSLTJ NEW/EST SF 35: CPT

## 2025-03-10 PROCEDURE — 99232 SBSQ HOSP IP/OBS MODERATE 35: CPT

## 2025-03-10 RX ORDER — ENOXAPARIN SODIUM 100 MG/ML
40 INJECTION SUBCUTANEOUS EVERY 24 HOURS
Refills: 0 | Status: DISCONTINUED | OUTPATIENT
Start: 2025-03-10 | End: 2025-03-11

## 2025-03-10 RX ORDER — CARVEDILOL 3.12 MG/1
1 TABLET, FILM COATED ORAL
Qty: 60 | Refills: 0
Start: 2025-03-10 | End: 2025-04-08

## 2025-03-10 RX ORDER — AMLODIPINE BESYLATE 10 MG/1
5 TABLET ORAL ONCE
Refills: 0 | Status: COMPLETED | OUTPATIENT
Start: 2025-03-10 | End: 2025-03-10

## 2025-03-10 RX ORDER — CARVEDILOL 3.12 MG/1
6.25 TABLET, FILM COATED ORAL EVERY 12 HOURS
Refills: 0 | Status: DISCONTINUED | OUTPATIENT
Start: 2025-03-10 | End: 2025-03-11

## 2025-03-10 RX ORDER — METOPROLOL SUCCINATE 50 MG/1
1 TABLET, EXTENDED RELEASE ORAL
Refills: 0 | DISCHARGE

## 2025-03-10 RX ORDER — MAGNESIUM SULFATE 500 MG/ML
1 SYRINGE (ML) INJECTION ONCE
Refills: 0 | Status: COMPLETED | OUTPATIENT
Start: 2025-03-10 | End: 2025-03-11

## 2025-03-10 RX ADMIN — Medication 1 APPLICATION(S): at 11:22

## 2025-03-10 RX ADMIN — METOPROLOL SUCCINATE 25 MILLIGRAM(S): 50 TABLET, EXTENDED RELEASE ORAL at 05:57

## 2025-03-10 RX ADMIN — HEPARIN SODIUM 5000 UNIT(S): 1000 INJECTION INTRAVENOUS; SUBCUTANEOUS at 05:57

## 2025-03-10 RX ADMIN — CARVEDILOL 6.25 MILLIGRAM(S): 3.12 TABLET, FILM COATED ORAL at 17:46

## 2025-03-10 RX ADMIN — Medication 40 MILLIGRAM(S): at 06:00

## 2025-03-10 RX ADMIN — AMLODIPINE BESYLATE 5 MILLIGRAM(S): 10 TABLET ORAL at 15:13

## 2025-03-10 RX ADMIN — LOSARTAN POTASSIUM 100 MILLIGRAM(S): 100 TABLET, FILM COATED ORAL at 05:57

## 2025-03-10 RX ADMIN — ATORVASTATIN CALCIUM 80 MILLIGRAM(S): 80 TABLET, FILM COATED ORAL at 21:07

## 2025-03-10 RX ADMIN — ENOXAPARIN SODIUM 40 MILLIGRAM(S): 100 INJECTION SUBCUTANEOUS at 21:07

## 2025-03-10 RX ADMIN — Medication 81 MILLIGRAM(S): at 11:21

## 2025-03-10 RX ADMIN — CLOPIDOGREL BISULFATE 75 MILLIGRAM(S): 75 TABLET, FILM COATED ORAL at 11:21

## 2025-03-10 NOTE — DISCHARGE NOTE PROVIDER - CARE PROVIDER_API CALL
Hansel Diaz  Internal Medicine  [address] [place] [id number]  Phone: [phone]  Fax: [phone]  Follow Up Time: 1 week

## 2025-03-10 NOTE — CONSULT NOTE ADULT - ASSESSMENT
64-year-old man with past medical history of hypertension, hyperlipidemia, ED status post stent at [facility] (01/2025) c/o chest pain.      IMPRESSION  #CAD S/P PCI at [facility] recently  #HTN, HLD  #Non Cardiac chest pain    PLAN  -  64-year-old man with past medical history of hypertension, hyperlipidemia, ED status post stent at [facility] (01/2025) c/o chest pain.      IMPRESSION  #CAD S/P PCI at [facility] recently  #HTN, HLD  #Non Cardiac chest pain    PLAN  - No further inpatient cardiac W/U   - can continue home medications including DAPT, Lipitor and Toprol   - OP cardiology f/u with his cardiologist Dr. Antoine  - Please see attending attestation below 64-year-old man with past medical history of hypertension, hyperlipidemia, ED status post stent at [facility] (01/2025) c/o chest pain.      IMPRESSION  #CAD S/P PCI at [facility] recently  #HTN, HLD  #Non Cardiac chest pain    PLAN  - can continue home medications including DAPT, Lipitor   - get a TTE  - OP cardiology f/u with his cardiologist Dr. Antoine  - Please see attending attestation below

## 2025-03-10 NOTE — DISCHARGE NOTE PROVIDER - NSDCMRMEDTOKEN_GEN_ALL_CORE_FT
aspirin 81 mg oral tablet: 1 tab(s) orally once a day  atorvastatin 80 mg oral tablet: 1 tab(s) orally once a day  Cozaar 100 mg oral tablet: 1 tab(s) orally once a day  Plavix 75 mg oral tablet: 1 tab(s) orally once a day  Protonix 40 mg oral delayed release tablet: 1 tab(s) orally once a day   aspirin 81 mg oral tablet: 1 tab(s) orally once a day  atorvastatin 80 mg oral tablet: 1 tab(s) orally once a day  carvedilol 6.25 mg oral tablet: 1 tab(s) orally every 12 hours  Cozaar 100 mg oral tablet: 1 tab(s) orally once a day  Plavix 75 mg oral tablet: 1 tab(s) orally once a day  Protonix 40 mg oral delayed release tablet: 1 tab(s) orally once a day

## 2025-03-10 NOTE — DISCHARGE NOTE PROVIDER - HOSPITAL COURSE
HPI:  64-year-old man with past medical history of hypertension, hyperlipidemia, recently admitted at [facility] for 100% occlusion proximal OM2 s/p ALESHA (01/2025) discharged on Aspirin and Plavix, presented to the ED c/o chest pain.  Patient stated that the day prior to presentation, the patient had left-sided chest pressure, acute , non radiating , no aggravating or relieving factors, constant in nature.   Recently was admitted at [facility]      In the ED;   Vitals : /116 mmHg; HR 93;  RR 18; 96% on RA.     Labs:  WBC 5.91K, Hb 15.7, MCV 95.3, Platelets 148 K, Na 140 K 4.8   BUN/Creatinine 8/1.1   LFT WNL     Troponin 15>> 12   EKG: No ischemic changes    s/p  Enalaprilat IV push; Metoprolol 100     Admitted to medicine for workup of chest pain    Hospital course:   # Non Cardiac Chest Pain  # NSTEMI OM2 100% occlusion s/p ALESHA ( Jan 2025)   # Hypertensive urgency   # Hx of Hypertension  # Hyperlipidemia  - presented with substernal chest pain ; non-radiating; constant; no aggravating or relieving factors ; not reproducible    - EKG : Non ischemic   - Troponin 15 --> 12 --> 13   - Hx recent OM2 occlusion s/p PCI   - Continue Aspirin and Plavix   - For BP: s/p Enalapril injection and metoprolol >> Continue home medications Losartan 100 mg; Metoprolol succinate 25 mg OD --> switching to carvedilol 3.125 BID on dc   - Continue Atorvastatin 80 mg OD    Discussion of discharge plan of care, including discharge diagnoses, medication reconciliation, and follow-ups was conducted with Dr. Michaud on 3/10/25 and discharge was approved.       HPI:  64-year-old man with past medical history of hypertension, hyperlipidemia, recently admitted at [facility] for 100% occlusion proximal OM2 s/p ALESHA (01/2025) discharged on Aspirin and Plavix, presented to the ED c/o chest pain.  Patient stated that the day prior to presentation, the patient had left-sided chest pressure, acute , non radiating , no aggravating or relieving factors, constant in nature.   Recently was admitted at [facility]      In the ED;   Vitals : /116 mmHg; HR 93;  RR 18; 96% on RA.     Labs:  WBC 5.91K, Hb 15.7, MCV 95.3, Platelets 148 K, Na 140 K 4.8   BUN/Creatinine 8/1.1   LFT WNL     Troponin 15>> 12   EKG: No ischemic changes    s/p  Enalaprilat IV push; Metoprolol 100     Admitted to medicine for workup of chest pain    Hospital course:   # Non Cardiac Chest Pain  # NSTEMI OM2 100% occlusion s/p ALESHA ( Jan 2025)   # Hypertensive urgency   # Hx of Hypertension  # Hyperlipidemia  - presented with substernal chest pain ; non-radiating; constant; no aggravating or relieving factors ; not reproducible    - EKG : Non ischemic   - Troponin 15 --> 12 --> 13   - Hx recent OM2 occlusion s/p PCI   - Continue Aspirin and Plavix   - For BP: s/p Enalapril injection and metoprolol >> Continue home medications Losartan 100 mg; Metoprolol succinate 25 mg OD --> switched to carvedilol 6.25 BID   - Continue Atorvastatin 80 mg OD  - TTE: EF 64%, Mild LV wall thickness, normal rt ventricle, sclerotic aortic valve     Discussion of discharge plan of care, including discharge diagnoses, medication reconciliation, and follow-ups was conducted with Dr. Hutson on 3/11/25 and discharge was approved.       HPI:  64-year-old man with past medical history of hypertension, hyperlipidemia, recently admitted at [facility] for 100% occlusion proximal OM2 s/p ALESHA (01/2025) discharged on Aspirin and Plavix, presented to the ED c/o chest pain.  Patient stated that the day prior to presentation, the patient had left-sided chest pressure, acute , non radiating , no aggravating or relieving factors, constant in nature.   Recently was admitted at [facility]      In the ED;   Vitals : /116 mmHg; HR 93;  RR 18; 96% on RA.     Labs:  WBC 5.91K, Hb 15.7, MCV 95.3, Platelets 148 K, Na 140 K 4.8   BUN/Creatinine 8/1.1   LFT WNL     Troponin 15>> 12   EKG: No ischemic changes    s/p  Enalaprilat IV push; Metoprolol 100     Admitted to medicine for workup of chest pain    Hospital course:   # Non Cardiac Chest Pain  # NSTEMI OM2 100% occlusion s/p ALESHA ( Jan 2025)   # Hypertensive urgency   # Hx of Hypertension  # Hyperlipidemia  - presented with substernal chest pain ; non-radiating; constant; no aggravating or relieving factors ; not reproducible    - EKG : Non ischemic   - Troponin 15 --> 12 --> 13   - Hx recent OM2 occlusion s/p PCI   - Continue Aspirin and Plavix   - For BP: s/p Enalapril injection and metoprolol >> Continue home medications Losartan 100 mg; Metoprolol succinate 25 mg OD --> switched to carvedilol 6.25 BID   - Continue Atorvastatin 80 mg OD  - TTE: EF 64%, Mild LV wall thickness, normal rt ventricle, sclerotic aortic valve   - Cards: outpt f/u with Dr. Antoine    Discussion of discharge plan of care, including discharge diagnoses, medication reconciliation, and follow-ups was conducted with Dr. Hutson on 3/11/25 and discharge was approved.

## 2025-03-10 NOTE — DISCHARGE NOTE PROVIDER - NSDCCPCAREPLAN_GEN_ALL_CORE_FT
PRINCIPAL DISCHARGE DIAGNOSIS  Diagnosis: Chest pain at rest  Assessment and Plan of Treatment: Chest Pain  Chest pain can be caused by many different conditions which may or may not be dangerous. Causes include heartburn, lung infections, heart attack, blood clot in lungs, skin infections, strain or damage to muscle, cartilage, or bones, etc. In addition to a history and physical examination, an electrocardiogram (ECG) and lab testing was done and it does not seem that your chest pain is from a cardiac cause. Follow up with your primary care provider and with your cardiologist as instructed.   SEEK IMMEDIATE MEDICAL CARE IF YOU HAVE ANY OF THE FOLLOWING SYMPTOMS: worsening chest pain, coughing up blood, unexplained back/neck/jaw pain, severe abdominal pain, dizziness or lightheadedness, fainting, shortness of breath, sweaty or clammy skin, vomiting, or racing heart beat. These symptoms may represent a serious problem that is an emergency. Do not wait to see if the symptoms will go away. Get medical help right away. Call 911 and do not drive yourself to the hospital.        SECONDARY DISCHARGE DIAGNOSES  Diagnosis: CAD with hypertension  Assessment and Plan of Treatment:      PRINCIPAL DISCHARGE DIAGNOSIS  Diagnosis: Chest pain at rest  Assessment and Plan of Treatment: Chest Pain  Chest pain can be caused by many different conditions which may or may not be dangerous. Causes include heartburn, lung infections, heart attack, blood clot in lungs, skin infections, strain or damage to muscle, cartilage, or bones, etc. In addition to a history and physical examination, an electrocardiogram (ECG) and lab testing was done and it does not seem that your chest pain is from a cardiac cause. Follow up with your primary care provider and with your cardiologist as instructed.   SEEK IMMEDIATE MEDICAL CARE IF YOU HAVE ANY OF THE FOLLOWING SYMPTOMS: worsening chest pain, coughing up blood, unexplained back/neck/jaw pain, severe abdominal pain, dizziness or lightheadedness, fainting, shortness of breath, sweaty or clammy skin, vomiting, or racing heart beat. These symptoms may represent a serious problem that is an emergency. Do not wait to see if the symptoms will go away. Get medical help right away. Call 911 and do not drive yourself to the hospital.        SECONDARY DISCHARGE DIAGNOSES  Diagnosis: CAD with hypertension  Assessment and Plan of Treatment: You were also found to have high blood pressure. Your medications were switched to help better control your blood pressure. You should continue to take your medications as prescribed.   Please follow up outpatient with your primary care physician for further adjustments as needed.

## 2025-03-10 NOTE — CONSULT NOTE ADULT - ATTENDING COMMENTS
Noncardiac Chest Pain  CAD s/p PCI 1/2025 at [facility]  Uncontrolled HTN    EKG reviewed:  SR, LAHB, lateral TWI    Reproducible midepigastric pain      - ECHO  - Agree with changing Metoprolol to Carvedilol  - Continue Losartan  - Continue DAPT  - If ECHO is unremarkable, no further inpatient workup

## 2025-03-10 NOTE — CONSULT NOTE ADULT - SUBJECTIVE AND OBJECTIVE BOX
Outpt cardiologist:    HPI:  64-year-old man with past medical history of hypertension, hyperlipidemia, ED status post stent at [facility] (2025) c/o chest pain.  Patient states that yesterday patient had left-sided chest pressure, acute , non radiating , no aggravating or relieving factors, constant in nature.   Recently was admitted at [facility] for 100% occlusion proximal OM2 s/p ALESHA.   Was discharged on Aspirin and Plavix.       In the ED;   Vitals : /116 mmHg; HR 93 ;  RR 18; 96% on RA.     Labs:  WBC 5.91 K Hb 15.7 MCV 95.3 Platelets 148 K   Na 140 K 4.8   BUN/Creatinine 8/1.1   LFT WNL     Troponin 15>> 12   EKG: No ischemic changes      s/p  Enalaprilat IV push; Metoprolol 100     Admitted to medicine for chest pain .    (08 Mar 2025 23:57)      ---  cardio fellow additional notes:        PAST MEDICAL & SURGICAL HISTORY  HTN (hypertension)    Hyperlipidemia    CAD S/P percutaneous coronary angioplasty        FAMILY HISTORY:  FAMILY HISTORY:      SOCIAL HISTORY:  Social History:      ALLERGIES:  No Known Allergies      MEDICATIONS:  aspirin  chewable 81 milliGRAM(s) Oral daily  atorvastatin 80 milliGRAM(s) Oral at bedtime  clopidogrel Tablet 75 milliGRAM(s) Oral daily  heparin   Injectable 5000 Unit(s) SubCutaneous every 8 hours  losartan 100 milliGRAM(s) Oral daily  magnesium sulfate  IVPB 1 Gram(s) IV Intermittent once  metoprolol succinate ER 25 milliGRAM(s) Oral daily  pantoprazole    Tablet 40 milliGRAM(s) Oral before breakfast  potassium chloride   Powder 40 milliEquivalent(s) Oral once    PRN:      HOME MEDICATIONS:  Home Medications:  aspirin 81 mg oral tablet: 1 tab(s) orally once a day (09 Mar 2025 01:46)  atorvastatin 80 mg oral tablet: 1 tab(s) orally once a day (09 Mar 2025 01:46)  Cozaar 100 mg oral tablet: 1 tab(s) orally once a day (09 Mar 2025 01:46)  metoprolol succinate 25 mg oral tablet, extended release: 1 tab(s) orally once a day (09 Mar 2025 01:46)  Plavix 75 mg oral tablet: 1 tab(s) orally once a day (09 Mar 2025 01:46)  Protonix 40 mg oral delayed release tablet: 1 tab(s) orally once a day (09 Mar 2025 01:46)      VITALS:   T(F): 97.7 (03-10 @ 07:58), Max: 98.9 ( @ 20:19)  HR: 69 (03-10 @ 07:58) (59 - 93)  BP: 157/99 (03-10 @ 07:58) (126/76 - 195/116)  BP(mean): 119 ( @ 23:43) (117 - 119)  RR: 18 (03-10 @ 07:58) (18 - 18)  SpO2: 97% (03-10 @ 07:58) (96% - 99%)    I&O's Summary          PHYSICAL EXAM:  General: Not in distress.    HEENT: EOMI  Cardio: regular, S1, S2, no murmur  Pulm: B/L BS.  No wheezing / crackles / rales  Abdomen: Soft, non-tender, non-distended. Normoactive bowel sounds  Extremities: No edema b/l le  Neuro: A&O x3. No focal deficits    LABS:                        16.4   5.05  )-----------( 147      ( 10 Mar 2025 07:41 )             47.4     03-10    142  |  106  |  13  ----------------------------<  104[H]  3.7   |  25  |  1.0    Ca    8.7      10 Mar 2025 07:41  Mg     1.9     03-10    TPro  5.7[L]  /  Alb  4.2  /  TBili  0.6  /  DBili  x   /  AST  15  /  ALT  15  /  AlkPhos  74  03-10              Troponin trend:            RADIOLOGY:  -CXR:    IMPRESSION:    No radiographic evidence of acute cardiopulmonary disease.    --- End of Report ---            JOSE CARLOS BECKWITH MD; Attending Radiologist  This document has been electronically signed. Mar  9 2025  9:32AM    -TTE:    Summary:   1. Left ventricular ejection fraction, by visual estimation, is 60 to   65%.   2. Normal global left ventricular systolic function.   3. Spectral Doppler shows impaired relaxation pattern of left   ventricular myocardial filling (Grade I diastolic dysfunction).   4. Mild left ventricular hypertrophy.    -CCTA:    There is a right dominant coronary arterial system. The left main   coronary artery bifurcates.    Left Main Artery: Normal.    Left Anterior Descending Artery: Normal.    Left Circumflex Artery: Normal.    Right Coronary Artery: Normal.    CARDIAC MORPHOLOGY: The cardiac chambers are normal in size  There is no   pericardial effusion. No left heart mass or thrombus. Right heart is   under opacified.    IMAGED AORTA: The thoracic aorta is normal in caliber.    IMAGED EXTRACARDIAC FINDINGS:  The chest is only partially imaged.  Visualized lungs without mass or consolidation. Visualized large airways   are patent.  Elevated right hemidiaphragm.    IMPRESSION:    Unremarkable coronary arteries.  CAD-RADS 0.    The total Agatston coronary artery calcium score equals 0.    CAD-RADS  Reporting and data system  ______________________________________________________________________    CAD-RADS 0  -    No plaque or stenosis                 Documented absence   of CAD  CAD-RADS 1  -    1-24% Minimal stenosis        Minimal   non-obstructive CAD  CAD-RADS 2  -    25 - 49% Mild stenosis                Mild   non-obstructive CAD  CAD-RADS 3  -    50 - 69% stenosis                        Moderate   Stenosis  CAD-RADS 4  -    A. 70 - 99% Stenosis  or    Severe Stenosis                                  B. Left main >50%  CAD-RADS 5  -    100% (total occlusion)                 Total coronary   occlusion  CAD-RADS N  -    Non-diagnostic study                   Obstructive CAD   cannot be excluded    --- End of Report ---            AMISH CURTIS MD; Attending Radiologist  This document has been electronically signed. Oct  4 2024  9:45AM    -STRESS TEST:  -CATHETERIZATION:  -OTHER:  EC Lead ECG:   Ventricular Rate 63 BPM    Atrial Rate 63 BPM    P-R Interval 186 ms    QRS Duration 94 ms    Q-T Interval 394 ms    QTC Calculation(Bazett) 403 ms    P Axis 60 degrees    R Axis -77 degrees    T Axis -29 degrees    Diagnosis Line Normal sinus rhythm  Left anterior fascicular block  Possible Inferior infarct , age undetermined  T wave abnormality, consider lateral ischemia  Abnormal ECG    Confirmed by STARLA ECHOLS, ROWAN ([id number]) on 3/9/2025 10:00:39 PM ( @ 15:10)      TELEMETRY EVENTS:   Outpt cardiologist: Dr. Antoine    HPI:  64-year-old man with past medical history of hypertension, hyperlipidemia, ED status post stent at [facility] [place] (2025) c/o chest pain.  Patient states that yesterday patient had left-sided chest pressure, acute , non radiating , no aggravating or relieving factors, constant in nature.   Recently was admitted at [facility] [place] for 100% occlusion proximal OM2 s/p ALESHA.   Was discharged on Aspirin and Plavix.       In the ED;   Vitals : /116 mmHg; HR 93 ;  RR 18; 96% on RA.     Labs:  WBC 5.91 K Hb 15.7 MCV 95.3 Platelets 148 K   Na 140 K 4.8   BUN/Creatinine 8/1.1   LFT WNL     Troponin 15>> 12   EKG: No ischemic changes      s/p  Enalaprilat IV push; Metoprolol 100     Admitted to medicine for chest pain .    (08 Mar 2025 23:57)      ---  cardio fellow additional notes:    Cardiology consulted for chest pain. The patient reports that his pain is significantly different from his recent MI. His current pain started around 3 weeks prior is central non radiating no pressure or shortness of breath no diaphoresis and is constant and reproducible by palpation of the inferior sternum.     PAST MEDICAL & SURGICAL HISTORY  HTN (hypertension)    Hyperlipidemia    CAD S/P percutaneous coronary angioplasty        FAMILY HISTORY:  FAMILY HISTORY:      SOCIAL HISTORY:  Social History:      ALLERGIES:  No Known Allergies      MEDICATIONS:  aspirin  chewable 81 milliGRAM(s) Oral daily  atorvastatin 80 milliGRAM(s) Oral at bedtime  clopidogrel Tablet 75 milliGRAM(s) Oral daily  heparin   Injectable 5000 Unit(s) SubCutaneous every 8 hours  losartan 100 milliGRAM(s) Oral daily  magnesium sulfate  IVPB 1 Gram(s) IV Intermittent once  metoprolol succinate ER 25 milliGRAM(s) Oral daily  pantoprazole    Tablet 40 milliGRAM(s) Oral before breakfast  potassium chloride   Powder 40 milliEquivalent(s) Oral once    PRN:      HOME MEDICATIONS:  Home Medications:  aspirin 81 mg oral tablet: 1 tab(s) orally once a day (09 Mar 2025 01:46)  atorvastatin 80 mg oral tablet: 1 tab(s) orally once a day (09 Mar 2025 01:46)  Cozaar 100 mg oral tablet: 1 tab(s) orally once a day (09 Mar 2025 01:46)  metoprolol succinate 25 mg oral tablet, extended release: 1 tab(s) orally once a day (09 Mar 2025 01:46)  Plavix 75 mg oral tablet: 1 tab(s) orally once a day (09 Mar 2025 01:46)  Protonix 40 mg oral delayed release tablet: 1 tab(s) orally once a day (09 Mar 2025 01:46)      VITALS:   T(F): 97.7 (03-10 @ 07:58), Max: 98.9 ( @ 20:19)  HR: 69 (03-10 @ 07:58) (59 - 93)  BP: 157/99 (03-10 @ 07:58) (126/76 - 195/116)  BP(mean): 119 ( @ 23:43) (117 - 119)  RR: 18 (03-10 @ 07:58) (18 - 18)  SpO2: 97% (03-10 @ 07:58) (96% - 99%)    I&O's Summary          PHYSICAL EXAM:  General: Not in distress.    HEENT: EOMI  Cardio: regular, S1, S2, no murmur  Pulm: B/L BS. clear  Abdomen: Soft, non-tender, non-distended.  Extremities: No edema b/l le  Neuro: A&O x3. No focal deficits    LABS:                        16.4   5.05  )-----------( 147      ( 10 Mar 2025 07:41 )             47.4     03-10    142  |  106  |  13  ----------------------------<  104[H]  3.7   |  25  |  1.0    Ca    8.7      10 Mar 2025 07:41  Mg     1.9     03-10    TPro  5.7[L]  /  Alb  4.2  /  TBili  0.6  /  DBili  x   /  AST  15  /  ALT  15  /  AlkPhos  74  03-10              Troponin trend:            RADIOLOGY:  -CXR:    IMPRESSION:    No radiographic evidence of acute cardiopulmonary disease.    --- End of Report ---            JOSE CARLOS BECKWITH MD; Attending Radiologist  This document has been electronically signed. Mar  9 2025  9:32AM    -TTE:    Summary:   1. Left ventricular ejection fraction, by visual estimation, is 60 to   65%.   2. Normal global left ventricular systolic function.   3. Spectral Doppler shows impaired relaxation pattern of left   ventricular myocardial filling (Grade I diastolic dysfunction).   4. Mild left ventricular hypertrophy.    -CCTA:    There is a right dominant coronary arterial system. The left main   coronary artery bifurcates.    Left Main Artery: Normal.    Left Anterior Descending Artery: Normal.    Left Circumflex Artery: Normal.    Right Coronary Artery: Normal.    CARDIAC MORPHOLOGY: The cardiac chambers are normal in size  There is no   pericardial effusion. No left heart mass or thrombus. Right heart is   under opacified.    IMAGED AORTA: The thoracic aorta is normal in caliber.    IMAGED EXTRACARDIAC FINDINGS:  The chest is only partially imaged.  Visualized lungs without mass or consolidation. Visualized large airways   are patent.  Elevated right hemidiaphragm.    IMPRESSION:    Unremarkable coronary arteries.  CAD-RADS 0.    The total Agatston coronary artery calcium score equals 0.    CAD-RADS  Reporting and data system  ______________________________________________________________________    CAD-RADS 0  -    No plaque or stenosis                 Documented absence   of CAD  CAD-RADS 1  -    1-24% Minimal stenosis        Minimal   non-obstructive CAD  CAD-RADS 2  -    25 - 49% Mild stenosis                Mild   non-obstructive CAD  CAD-RADS 3  -    50 - 69% stenosis                        Moderate   Stenosis  CAD-RADS 4  -    A. 70 - 99% Stenosis  or    Severe Stenosis                                  B. Left main >50%  CAD-RADS 5  -    100% (total occlusion)                 Total coronary   occlusion  CAD-RADS N  -    Non-diagnostic study                   Obstructive CAD   cannot be excluded    --- End of Report ---            AMISH CURTIS MD; Attending Radiologist  This document has been electronically signed. Oct  4 2024  9:45AM    -STRESS TEST:  -CATHETERIZATION:  -OTHER:  EC Lead ECG:   Ventricular Rate 63 BPM    Atrial Rate 63 BPM    P-R Interval 186 ms    QRS Duration 94 ms    Q-T Interval 394 ms    QTC Calculation(Bazett) 403 ms    P Axis 60 degrees    R Axis -77 degrees    T Axis -29 degrees    Diagnosis Line Normal sinus rhythm  Left anterior fascicular block  Possible Inferior infarct , age undetermined  T wave abnormality, consider lateral ischemia  Abnormal ECG    Confirmed by STARLA ECHOLS, ROWAN ([id number]) on 3/9/2025 10:00:39 PM ( @ 15:10)      TELEMETRY EVENTS:  JOSH   Outpt cardiologist: Dr. Antoine    HPI:  64-year-old man with past medical history of hypertension, hyperlipidemia, ED status post stent at [facility] (2025) c/o chest pain.  Patient states that yesterday patient had left-sided chest pressure, acute , non radiating , no aggravating or relieving factors, constant in nature.   Recently was admitted at [facility] for 100% occlusion proximal OM2 s/p ALESHA.   Was discharged on Aspirin and Plavix.       In the ED;   Vitals : /116 mmHg; HR 93 ;  RR 18; 96% on RA.     Labs:  WBC 5.91 K Hb 15.7 MCV 95.3 Platelets 148 K   Na 140 K 4.8   BUN/Creatinine 8/1.1   LFT WNL     Troponin 15>> 12   EKG: No ischemic changes      s/p  Enalaprilat IV push; Metoprolol 100     Admitted to medicine for chest pain .    (08 Mar 2025 23:57)      ---  cardio fellow additional notes:    Cardiology consulted for chest pain. The patient reports that his pain is significantly different from his recent MI. His current pain started around 3 weeks prior is central non radiating no pressure or shortness of breath no diaphoresis and is constant and reproducible by palpation of the inferior sternum.       PAST MEDICAL & SURGICAL HISTORY  HTN (hypertension)    Hyperlipidemia    CAD S/P percutaneous coronary angioplasty      ALLERGIES:  No Known Allergies      MEDICATIONS:  aspirin  chewable 81 milliGRAM(s) Oral daily  atorvastatin 80 milliGRAM(s) Oral at bedtime  clopidogrel Tablet 75 milliGRAM(s) Oral daily  heparin   Injectable 5000 Unit(s) SubCutaneous every 8 hours  losartan 100 milliGRAM(s) Oral daily  magnesium sulfate  IVPB 1 Gram(s) IV Intermittent once  metoprolol succinate ER 25 milliGRAM(s) Oral daily  pantoprazole    Tablet 40 milliGRAM(s) Oral before breakfast  potassium chloride   Powder 40 milliEquivalent(s) Oral once    PRN:      HOME MEDICATIONS:  Home Medications:  aspirin 81 mg oral tablet: 1 tab(s) orally once a day (09 Mar 2025 01:46)  atorvastatin 80 mg oral tablet: 1 tab(s) orally once a day (09 Mar 2025 01:46)  Cozaar 100 mg oral tablet: 1 tab(s) orally once a day (09 Mar 2025 01:46)  metoprolol succinate 25 mg oral tablet, extended release: 1 tab(s) orally once a day (09 Mar 2025 01:46)  Plavix 75 mg oral tablet: 1 tab(s) orally once a day (09 Mar 2025 01:46)  Protonix 40 mg oral delayed release tablet: 1 tab(s) orally once a day (09 Mar 2025 01:46)      VITALS:   T(F): 97.7 (03-10 @ 07:58), Max: 98.9 ( @ 20:19)  HR: 69 (03-10 @ 07:58) (59 - 93)  BP: 157/99 (03-10 @ 07:58) (126/76 - 195/116)  BP(mean): 119 ( @ 23:43) (117 - 119)  RR: 18 (03-10 @ 07:58) (18 - 18)  SpO2: 97% (03-10 @ 07:58) (96% - 99%)      REVIEW OF SYSTEMS:  CONSTITUTIONAL: No fever, weight loss, fatigue  NECK: No pain or stiffness  RESPIRATORY: See HPI  CARDIOVASCULAR: See HPI  GASTROINTESTINAL: No abdominal/epigastric pain, nausea, vomiting, hematemesis, diarrhea, constipation, melena or hematochezia  GENITOURINARY: No dysuria, frequency, hematuria, incontinence  NEUROLOGICAL: No headaches, memory loss, loss of strength, numbness, tremors  SKIN: No itching, burning, rashes, lesions   ENDOCRINE: No heat/cold intolerance or hair loss  MUSCULOSKELETAL: No joint pain or swelling  HEME/LYMPH: No easy bruising or bleeding gums      PHYSICAL EXAM:  General: Not in distress.    HEENT: EOMI  Cardio: regular, S1, S2, no murmur  Pulm: B/L BS. clear  Abdomen: Soft, non-tender, non-distended  Extremities: No edema b/l le  Neuro: A&O x3. No focal deficits.      LABS:                        16.4   5.05  )-----------( 147      ( 10 Mar 2025 07:41 )             47.4     03-10    142  |  106  |  13  ----------------------------<  104[H]  3.7   |  25  |  1.0    Ca    8.7      10 Mar 2025 07:41  Mg     1.9     03-10    TPro  5.7[L]  /  Alb  4.2  /  TBili  0.6  /  DBili  x   /  AST  15  /  ALT  15  /  AlkPhos  74  03-10          RADIOLOGY:  -CXR:    IMPRESSION:    No radiographic evidence of acute cardiopulmonary disease.    --- End of Report ---        < from: CT Angio Heart and Coronaries w/ IV Cont (10.04.24 @ 08:52) >  INTERPRETATION:    Calcium Score: 0    CORONARY CT ANGIOGRAM:    There is a right dominant coronary arterial system. The left main   coronary artery bifurcates.    Left Main Artery: Normal.    Left Anterior Descending Artery: Normal.    Left Circumflex Artery: Normal.    Right Coronary Artery: Normal.    CARDIAC MORPHOLOGY: The cardiac chambers are normal in size  There is no   pericardial effusion. No left heart mass or thrombus. Right heart is   under opacified.    IMAGED AORTA: The thoracic aorta is normal in caliber.    IMAGED EXTRACARDIAC FINDINGS:  The chest is only partially imaged.  Visualized lungs without mass or consolidation. Visualized large airways   are patent.  Elevated right hemidiaphragm.    IMPRESSION:    Unremarkable coronary arteries.  CAD-RADS 0.    The total Agatston coronary artery calcium score equals 0.    < end of copied text >        EC Lead ECG:   Ventricular Rate 63 BPM    Atrial Rate 63 BPM    P-R Interval 186 ms    QRS Duration 94 ms    Q-T Interval 394 ms    QTC Calculation(Bazett) 403 ms    P Axis 60 degrees    R Axis -77 degrees    T Axis -29 degrees    Diagnosis Line Normal sinus rhythm  Left anterior fascicular block  Possible Inferior infarct , age undetermined  T wave abnormality, consider lateral ischemia  Abnormal ECG    Confirmed by STARLA ECHOLS, ROWAN ([id number]) on 3/9/2025 10:00:39 PM ( @ 15:10)      TELEMETRY EVENTS:  NSR

## 2025-03-10 NOTE — DISCHARGE NOTE PROVIDER - NSDCFUADDAPPT_GEN_ALL_CORE_FT
Pt has an outside cardiologist and will follow up outpatient as well Pt has an outside cardiologist Dr. Antoine and will follow up outpatient as well

## 2025-03-11 ENCOUNTER — TRANSCRIPTION ENCOUNTER (OUTPATIENT)
Age: 65
End: 2025-03-11

## 2025-03-11 VITALS
TEMPERATURE: 98 F | OXYGEN SATURATION: 95 % | HEIGHT: 69 IN | SYSTOLIC BLOOD PRESSURE: 111 MMHG | WEIGHT: 216.93 LBS | HEART RATE: 79 BPM | DIASTOLIC BLOOD PRESSURE: 70 MMHG

## 2025-03-11 LAB
ALBUMIN SERPL ELPH-MCNC: 4.3 G/DL — SIGNIFICANT CHANGE UP (ref 3.5–5.2)
ALP SERPL-CCNC: 68 U/L — SIGNIFICANT CHANGE UP (ref 30–115)
ALT FLD-CCNC: 18 U/L — SIGNIFICANT CHANGE UP (ref 0–41)
ANION GAP SERPL CALC-SCNC: 10 MMOL/L — SIGNIFICANT CHANGE UP (ref 7–14)
AST SERPL-CCNC: 19 U/L — SIGNIFICANT CHANGE UP (ref 0–41)
BASOPHILS # BLD AUTO: 0.02 K/UL — SIGNIFICANT CHANGE UP (ref 0–0.2)
BASOPHILS NFR BLD AUTO: 0.4 % — SIGNIFICANT CHANGE UP (ref 0–1)
BILIRUB SERPL-MCNC: 0.8 MG/DL — SIGNIFICANT CHANGE UP (ref 0.2–1.2)
BUN SERPL-MCNC: 12 MG/DL — SIGNIFICANT CHANGE UP (ref 10–20)
CALCIUM SERPL-MCNC: 9.4 MG/DL — SIGNIFICANT CHANGE UP (ref 8.4–10.5)
CHLORIDE SERPL-SCNC: 105 MMOL/L — SIGNIFICANT CHANGE UP (ref 98–110)
CO2 SERPL-SCNC: 27 MMOL/L — SIGNIFICANT CHANGE UP (ref 17–32)
CREAT SERPL-MCNC: 1 MG/DL — SIGNIFICANT CHANGE UP (ref 0.7–1.5)
EGFR: 84 ML/MIN/1.73M2 — SIGNIFICANT CHANGE UP
EGFR: 84 ML/MIN/1.73M2 — SIGNIFICANT CHANGE UP
EOSINOPHIL # BLD AUTO: 0.26 K/UL — SIGNIFICANT CHANGE UP (ref 0–0.7)
EOSINOPHIL NFR BLD AUTO: 5 % — SIGNIFICANT CHANGE UP (ref 0–8)
GLUCOSE SERPL-MCNC: 103 MG/DL — HIGH (ref 70–99)
HCT VFR BLD CALC: 47.9 % — SIGNIFICANT CHANGE UP (ref 42–52)
HGB BLD-MCNC: 16.4 G/DL — SIGNIFICANT CHANGE UP (ref 14–18)
IMM GRANULOCYTES NFR BLD AUTO: 0.4 % — HIGH (ref 0.1–0.3)
LYMPHOCYTES # BLD AUTO: 0.84 K/UL — LOW (ref 1.2–3.4)
LYMPHOCYTES # BLD AUTO: 16.1 % — LOW (ref 20.5–51.1)
MAGNESIUM SERPL-MCNC: 2.3 MG/DL — SIGNIFICANT CHANGE UP (ref 1.8–2.4)
MCHC RBC-ENTMCNC: 32.6 PG — HIGH (ref 27–31)
MCHC RBC-ENTMCNC: 34.2 G/DL — SIGNIFICANT CHANGE UP (ref 32–37)
MCV RBC AUTO: 95.2 FL — HIGH (ref 80–94)
MONOCYTES # BLD AUTO: 0.35 K/UL — SIGNIFICANT CHANGE UP (ref 0.1–0.6)
MONOCYTES NFR BLD AUTO: 6.7 % — SIGNIFICANT CHANGE UP (ref 1.7–9.3)
NEUTROPHILS # BLD AUTO: 3.73 K/UL — SIGNIFICANT CHANGE UP (ref 1.4–6.5)
NEUTROPHILS NFR BLD AUTO: 71.4 % — SIGNIFICANT CHANGE UP (ref 42.2–75.2)
NRBC BLD AUTO-RTO: 0 /100 WBCS — SIGNIFICANT CHANGE UP (ref 0–0)
PLATELET # BLD AUTO: 150 K/UL — SIGNIFICANT CHANGE UP (ref 130–400)
PMV BLD: 10.4 FL — SIGNIFICANT CHANGE UP (ref 7.4–10.4)
POTASSIUM SERPL-MCNC: 4.3 MMOL/L — SIGNIFICANT CHANGE UP (ref 3.5–5)
POTASSIUM SERPL-SCNC: 4.3 MMOL/L — SIGNIFICANT CHANGE UP (ref 3.5–5)
PROT SERPL-MCNC: 6.3 G/DL — SIGNIFICANT CHANGE UP (ref 6–8)
RBC # BLD: 5.03 M/UL — SIGNIFICANT CHANGE UP (ref 4.7–6.1)
RBC # FLD: 12.2 % — SIGNIFICANT CHANGE UP (ref 11.5–14.5)
SODIUM SERPL-SCNC: 142 MMOL/L — SIGNIFICANT CHANGE UP (ref 135–146)
WBC # BLD: 5.22 K/UL — SIGNIFICANT CHANGE UP (ref 4.8–10.8)
WBC # FLD AUTO: 5.22 K/UL — SIGNIFICANT CHANGE UP (ref 4.8–10.8)

## 2025-03-11 PROCEDURE — 99239 HOSP IP/OBS DSCHRG MGMT >30: CPT

## 2025-03-11 RX ORDER — CARVEDILOL 3.12 MG/1
1 TABLET, FILM COATED ORAL
Qty: 60 | Refills: 0
Start: 2025-03-11 | End: 2025-04-09

## 2025-03-11 RX ADMIN — Medication 100 GRAM(S): at 05:31

## 2025-03-11 RX ADMIN — Medication 81 MILLIGRAM(S): at 12:37

## 2025-03-11 RX ADMIN — Medication 40 MILLIEQUIVALENT(S): at 05:29

## 2025-03-11 RX ADMIN — CLOPIDOGREL BISULFATE 75 MILLIGRAM(S): 75 TABLET, FILM COATED ORAL at 12:37

## 2025-03-11 RX ADMIN — Medication 1 APPLICATION(S): at 12:37

## 2025-03-11 RX ADMIN — Medication 40 MILLIGRAM(S): at 05:29

## 2025-03-11 RX ADMIN — LOSARTAN POTASSIUM 100 MILLIGRAM(S): 100 TABLET, FILM COATED ORAL at 05:29

## 2025-03-11 RX ADMIN — CARVEDILOL 6.25 MILLIGRAM(S): 3.12 TABLET, FILM COATED ORAL at 05:29

## 2025-03-11 NOTE — DIETITIAN INITIAL EVALUATION ADULT - NAME AND PHONE
Tory Cervantes x [phone] or [address] [employer]   low risk follow up      Monitoring/Evaluating: PO intake, Labs, Lytes, Wts,  Diet and texture tolerance, NFPF, GI S/S, BM.

## 2025-03-11 NOTE — PROGRESS NOTE ADULT - ASSESSMENT
64 years old male with hypertension; hyperlipidemia  presented to the ED for chest pain.    # Non Cardiac Chest Pain: Dx : Costochondritis   # NSTEMI OM2 100% occlusion s/p ALESHA ( Jan 2025)   # Hypertensive urgency:   # Hypertension:   # Hyperlipidemia:   - complains of substernal chest pain ; non-radiating; constant; no aggravating or relieving factors ; Pain on PALPATION   - EKG : Non ischemic   - Troponin x2 : Flat  - OM2 occlusion s/p PCI   - Continue Aspirin and Plavix   - BP trending down ; s/p Enalapril injection and metoprolol >> Continue home medication Losartan 100 mg ; Metoprolol succinate 25 mg OD.  - Continue Atorvastatin 80 mg OD  - repeat EKG   - Monitor BP     # MISC:   - DVT: Heparin ppx   - GI : ppz   - Diet : DASH/TLC   - Activity ; Ambulate as tolerated .     Patient says his chest still hurts and does not want to leave today. Prepare for discharge order placed. Repeat troponin and EKG ordered.
64 years old male with hypertension; hyperlipidemia  presented to the ED for chest pain.    #  Chest Pain: seen by cardiology-- No further inpatient cardiac W/U   - can continue home medications including DAPT, Lipitor and Toprol   - OP cardiology f/u with his cardiologist Dr. Antoine  cardiology wants echovcardiogram  CEx3 are negative  # NSTEMI OM2 100% occlusion s/p ALESHA ( Jan 2025) Continue Aspirin and Plavix   # Hypertensive urgency: Continue home medication Losartan 100 mg ; Metoprolol succinate 25 mg OD.-- change to carvedilol 6.25mg Bid and added amlodipine 5mg    # Hyperlipidemia:   - Continue Atorvastatin 80 mg OD  -    # MISC:   - DVT: Heparin ppx   - GI : ppz   - Diet : DASH/TLC   - Activity ; Ambulate as tolerated .     DC cancelled today as BP very high and cardio wants echo-- DC plan in AM    
64 years old male with hypertension; hyperlipidemia  presented to the ED for chest pain.    # Non Cardiac Chest Pain  # NSTEMI OM2 100% occlusion s/p ALESHA ( Jan 2025)   # Hypertensive urgency   # Hx of Hypertension  # Hyperlipidemia  - presented with substernal chest pain ; non-radiating; constant; no aggravating or relieving factors ; not reproducible    - EKG : Non ischemic   - Troponin 15 --> 12 --> 13   - Hx recent OM2 occlusion s/p PCI   - Continue Aspirin and Plavix   - For BP: s/p Enalapril injection and metoprolol >> Continue home medications Losartan 100 mg; Metoprolol succinate 25 mg OD --> switched to carvedilol 6.25 BID   - Continue Atorvastatin 80 mg OD  - TTE: EF 64%, Mild LV wall thickness, normal rt ventricle, sclerotic aortic valve   - Cards: outpt f/u with Dr. Antoine    # MISC:   - DVT: Heparin ppx   - GI : protonix   - Diet : DASH/TLC   - Activity ; Ambulate as tolerated     Pending:  - Pending DC  
64 years old male with hypertension; hyperlipidemia  presented to the ED for chest pain.    # Non Cardiac Chest Pain  # NSTEMI OM2 100% occlusion s/p ALESHA ( Jan 2025)   # Hypertensive urgency   # Hx of Hypertension  # Hyperlipidemia  - presented with substernal chest pain ; non-radiating; constant; no aggravating or relieving factors ; not reproducible    - EKG : Non ischemic   - Troponin 15 --> 12 --> 13   - Hx recent OM2 occlusion s/p PCI   - Continue Aspirin and Plavix   - For BP: s/p Enalapril injection and metoprolol >> Continue home medications Losartan 100 mg; Metoprolol succinate 25 mg OD --> switching to carvedilol 6.25 BID, also given 1 dose amlodipine  -  - Continue Atorvastatin 80 mg OD  - f/u Echo  - f/u final cardio recs     # MISC:   - DVT: Heparin ppx   - GI : protonix   - Diet : DASH/TLC   - Activity ; Ambulate as tolerated     Pending:  - echo  - BP control

## 2025-03-11 NOTE — DIETITIAN INITIAL EVALUATION ADULT - OTHER CALCULATIONS
Estimated energy and protein needs with consideration for weight maintenance, BMI, age, mobility  and comorbidities.

## 2025-03-11 NOTE — DIETITIAN INITIAL EVALUATION ADULT - ORAL INTAKE PTA/DIET HISTORY
Pt AAOx4. Prefers English. states height 5 ft 4 inches. UBW: 155 lbs. CBW: 154 lbs |70 kg . Endorses strong appetite PTA. states he's currently completing meals. NKFA. No [employer] diet restrictions. No MVI. Denies chewing swallowing difficulty. Last BM 3/10. Denies N/V/D x 24 hours.

## 2025-03-11 NOTE — DIETITIAN INITIAL EVALUATION ADULT - PERTINENT MEDS FT
MEDICATIONS  (STANDING):  aspirin  chewable 81 milliGRAM(s) Oral daily  atorvastatin 80 milliGRAM(s) Oral at bedtime  carvedilol 6.25 milliGRAM(s) Oral every 12 hours  chlorhexidine 2% Cloths 1 Application(s) Topical daily  clopidogrel Tablet 75 milliGRAM(s) Oral daily  enoxaparin Injectable 40 milliGRAM(s) SubCutaneous every 24 hours  losartan 100 milliGRAM(s) Oral daily  pantoprazole    Tablet 40 milliGRAM(s) Oral before breakfast    MEDICATIONS  (PRN):

## 2025-03-11 NOTE — DIETITIAN INITIAL EVALUATION ADULT - NSICDXPASTMEDICALHX_GEN_ALL_CORE_FT
PAST MEDICAL HISTORY:  CAD S/P percutaneous coronary angioplasty     HTN (hypertension)     Hyperlipidemia

## 2025-03-11 NOTE — DIETITIAN INITIAL EVALUATION ADULT - NS FNS DIET ORDER
Diet, Regular:   DASH/TLC {Sodium & Cholesterol Restricted} (DASH) (03-09-25 @ 00:16) [Active]

## 2025-03-11 NOTE — DISCHARGE NOTE NURSING/CASE MANAGEMENT/SOCIAL WORK - FINANCIAL ASSISTANCE
[facility] provides services at a reduced cost to those who are determined to be eligible through [facility]’s financial assistance program. Information regarding [facility]’s financial assistance program can be found by going to [email or web address] or by calling [phone].

## 2025-03-11 NOTE — PROGRESS NOTE ADULT - SUBJECTIVE AND OBJECTIVE BOX
Hospital Day:  1d    Subjective:    Patient is a 64y old  Male who presents with a chief complaint of Chest pain (08 Mar 2025 23:57)    This morning patient is lying in bed comfortably.   He reports his chest pain is still present.      Past Medical Hx:   No pertinent past medical history    HTN (hypertension)    Hyperlipidemia    CAD S/P percutaneous coronary angioplasty      Past Sx:  No significant past surgical history      Allergies:  No Known Allergies    Current Meds:   Standng Meds:  aspirin  chewable 81 milliGRAM(s) Oral daily  atorvastatin 80 milliGRAM(s) Oral at bedtime  clopidogrel Tablet 75 milliGRAM(s) Oral daily  heparin   Injectable 5000 Unit(s) SubCutaneous every 8 hours  losartan 100 milliGRAM(s) Oral daily  metoprolol succinate ER 25 milliGRAM(s) Oral daily  pantoprazole    Tablet 40 milliGRAM(s) Oral before breakfast    PRN Meds:    HOME MEDICATIONS:  aspirin 81 mg oral tablet: 1 tab(s) orally once a day  atorvastatin 80 mg oral tablet: 1 tab(s) orally once a day  Cozaar 100 mg oral tablet: 1 tab(s) orally once a day  metoprolol succinate 25 mg oral tablet, extended release: 1 tab(s) orally once a day  Plavix 75 mg oral tablet: 1 tab(s) orally once a day  Protonix 40 mg oral delayed release tablet: 1 tab(s) orally once a day      Vital Signs:   T(F): 97.2 (03-09-25 @ 07:49), Max: 98.9 (03-08-25 @ 20:19)  HR: 60 (03-09-25 @ 07:49) (59 - 93)  BP: 140/90 (03-09-25 @ 07:49) (126/76 - 195/116)  RR: 18 (03-09-25 @ 07:49) (18 - 18)  SpO2: 97% (03-09-25 @ 07:49) (96% - 98%)        Physical Exam:   GENERAL: NAD  HEENT: NCAT  CHEST/LUNG: CTAB  HEART: Regular rate and rhythm; s1 s2 appreciated, No murmurs, rubs, or gallops  ABDOMEN: Soft, Nontender, Nondistended; Bowel sounds present  EXTREMITIES: No LE edema b/l  SKIN: no rashes, no new lesions  NERVOUS SYSTEM:  Alert & Oriented X3          Labs:                         15.8   5.13  )-----------( 157      ( 09 Mar 2025 08:07 )             45.5     Neutophil% --, Lymphocyte% --, Monocyte% --, Bands% 0.2 03-09-25 @ 08:07    09 Mar 2025 08:07    143    |  106    |  8      ----------------------------<  96     3.8     |  29     |  1.0      Ca    8.6        09 Mar 2025 08:07  Mg     1.8       09 Mar 2025 08:07    TPro  5.5    /  Alb  4.0    /  TBili  0.7    /  DBili  x      /  AST  14     /  ALT  15     /  AlkPhos  68     09 Mar 2025 08:07                    Urinalysis Basic - ( 09 Mar 2025 08:07 )    Color: x / Appearance: x / SG: x / pH: x  Gluc: 96 mg/dL / Ketone: x  / Bili: x / Urobili: x   Blood: x / Protein: x / Nitrite: x   Leuk Esterase: x / RBC: x / WBC x   Sq Epi: x / Non Sq Epi: x / Bacteria: x                Assessment and Plan:   
SUBJECTIVE/OVERNIGHT EVENTS  Today is hospital day 2d. This morning patient was seen and examined at bedside, resting comfortably in bed. No acute or major events overnight.  Pt reporting continued mild constant CP.     MEDICATIONS  STANDING MEDICATIONS  aspirin  chewable 81 milliGRAM(s) Oral daily  atorvastatin 80 milliGRAM(s) Oral at bedtime  carvedilol 6.25 milliGRAM(s) Oral every 12 hours  chlorhexidine 2% Cloths 1 Application(s) Topical daily  clopidogrel Tablet 75 milliGRAM(s) Oral daily  enoxaparin Injectable 40 milliGRAM(s) SubCutaneous every 24 hours  losartan 100 milliGRAM(s) Oral daily  magnesium sulfate  IVPB 1 Gram(s) IV Intermittent once  pantoprazole    Tablet 40 milliGRAM(s) Oral before breakfast  potassium chloride   Powder 40 milliEquivalent(s) Oral once    PRN MEDICATIONS    VITALS  T(F): 97.7 (03-10-25 @ 07:58), Max: 97.7 (03-10-25 @ 07:58)  HR: 69 (03-10-25 @ 12:17) (68 - 72)  BP: 167/117 (03-10-25 @ 12:17) (156/100 - 170/106)  RR: 18 (03-10-25 @ 07:58) (18 - 18)  SpO2: 97% (03-10-25 @ 07:58) (97% - 99%)    PHYSICAL EXAM  General: NAD, non-toxic appearing  HEENT: EOMi, no scleral icterus  CV: RRR, normal s1 and s2, CP not reporducible   Lungs: normal respiratory effort, CTAB  Abd: Soft, nontender, nondistended  Ext: no edema, warm, well perfused  Psych: A+Ox3, appropriate affect  Neuro: grossly non-focal, moving all extremities spontaneously  Skin: no rashes or lesions     LABS             16.4   5.05  )-----------( 147      ( 03-10-25 @ 07:41 )             47.4     142  |  106  |  13  -------------------------<  104   03-10-25 @ 07:41  3.7  |  25  |  1.0    Ca      8.7     03-10-25 @ 07:41  Mg     1.9     03-10-25 @ 07:41    TPro  5.7  /  Alb  4.2  /  TBili  0.6  /  DBili  x   /  AST  15  /  ALT  15  /  AlkPhos  74  /  GGT  x     03-10-25 @ 07:41      Troponin T, High Sensitivity Result: 13 ng/L (03-09-25 @ 16:56)  Troponin T, High Sensitivity Result: 12 ng/L (03-08-25 @ 18:16)  Troponin T, High Sensitivity Result: 15 ng/L (03-08-25 @ 16:21)    Urinalysis Basic - ( 10 Mar 2025 07:41 )    Color: x / Appearance: x / SG: x / pH: x  Gluc: 104 mg/dL / Ketone: x  / Bili: x / Urobili: x   Blood: x / Protein: x / Nitrite: x   Leuk Esterase: x / RBC: x / WBC x   Sq Epi: x / Non Sq Epi: x / Bacteria: x        
SUBJECTIVE/OVERNIGHT EVENTS  Today is hospital day 3d. This morning patient was seen and examined at bedside, resting comfortably in bed. No acute or major events overnight.    MEDICATIONS  STANDING MEDICATIONS  aspirin  chewable 81 milliGRAM(s) Oral daily  atorvastatin 80 milliGRAM(s) Oral at bedtime  carvedilol 6.25 milliGRAM(s) Oral every 12 hours  chlorhexidine 2% Cloths 1 Application(s) Topical daily  clopidogrel Tablet 75 milliGRAM(s) Oral daily  enoxaparin Injectable 40 milliGRAM(s) SubCutaneous every 24 hours  losartan 100 milliGRAM(s) Oral daily  pantoprazole    Tablet 40 milliGRAM(s) Oral before breakfast    PRN MEDICATIONS    VITALS  T(F): 97.8 (03-11-25 @ 07:08), Max: 98 (03-10-25 @ 23:49)  HR: 110 (03-11-25 @ 07:08) (69 - 110)  BP: 129/90 (03-11-25 @ 07:08) (129/90 - 167/117)  RR: 18 (03-11-25 @ 07:08) (18 - 18)  SpO2: 97% (03-11-25 @ 07:08) (96% - 99%)    PHYSICAL EXAM  General: NAD, non-toxic appearing  HEENT: EOMi, no scleral icterus  CV: RRR, normal s1 and s2, CP not reporducible   Lungs: normal respiratory effort, CTAB  Abd: Soft, nontender, nondistended  Ext: no edema, warm, well perfused  Psych: A+Ox3, appropriate affect  Neuro: grossly non-focal, moving all extremities spontaneously  Skin: no rashes or lesions       LABS             16.4   5.22  )-----------( 150      ( 03-11-25 @ 04:30 )             47.9     142  |  105  |  12  -------------------------<  103   03-11-25 @ 04:30  4.3  |  27  |  1.0    Ca      9.4     03-11-25 @ 04:30  Mg     2.3     03-11-25 @ 04:30    TPro  6.3  /  Alb  4.3  /  TBili  0.8  /  DBili  x   /  AST  19  /  ALT  18  /  AlkPhos  68  /  GGT  x     03-11-25 @ 04:30      Troponin T, High Sensitivity Result: 13 ng/L (03-09-25 @ 16:56)  Troponin T, High Sensitivity Result: 12 ng/L (03-08-25 @ 18:16)  Troponin T, High Sensitivity Result: 15 ng/L (03-08-25 @ 16:21)    Urinalysis Basic - ( 11 Mar 2025 04:30 )    Color: x / Appearance: x / SG: x / pH: x  Gluc: 103 mg/dL / Ketone: x  / Bili: x / Urobili: x   Blood: x / Protein: x / Nitrite: x   Leuk Esterase: x / RBC: x / WBC x   Sq Epi: x / Non Sq Epi: x / Bacteria: x      
SUBJECTIVE:    Patient is a 64y old Male who presents with a chief complaint of Chest pain (10 Mar 2025 13:57)    Currently admitted to medicine with the primary diagnosis of Chest pain at rest       Today is hospital day 2d.     PAST MEDICAL & SURGICAL HISTORY  HTN (hypertension)    Hyperlipidemia    CAD S/P percutaneous coronary angioplasty      ALLERGIES:  No Known Allergies    MEDICATIONS:  STANDING MEDICATIONS  aspirin  chewable 81 milliGRAM(s) Oral daily  atorvastatin 80 milliGRAM(s) Oral at bedtime  carvedilol 6.25 milliGRAM(s) Oral every 12 hours  chlorhexidine 2% Cloths 1 Application(s) Topical daily  clopidogrel Tablet 75 milliGRAM(s) Oral daily  enoxaparin Injectable 40 milliGRAM(s) SubCutaneous every 24 hours  losartan 100 milliGRAM(s) Oral daily  magnesium sulfate  IVPB 1 Gram(s) IV Intermittent once  pantoprazole    Tablet 40 milliGRAM(s) Oral before breakfast  potassium chloride   Powder 40 milliEquivalent(s) Oral once    PRN MEDICATIONS    VITALS:   T(F): 97.7  HR: 69  BP: 167/117  RR: 18  SpO2: 97%    LABS:                        16.4   5.05  )-----------( 147      ( 10 Mar 2025 07:41 )             47.4     03-10    142  |  106  |  13  ----------------------------<  104[H]  3.7   |  25  |  1.0    Ca    8.7      10 Mar 2025 07:41  Mg     1.9     03-10    TPro  5.7[L]  /  Alb  4.2  /  TBili  0.6  /  DBili  x   /  AST  15  /  ALT  15  /  AlkPhos  74  03-10      Urinalysis Basic - ( 10 Mar 2025 07:41 )    Color: x / Appearance: x / SG: x / pH: x  Gluc: 104 mg/dL / Ketone: x  / Bili: x / Urobili: x   Blood: x / Protein: x / Nitrite: x   Leuk Esterase: x / RBC: x / WBC x   Sq Epi: x / Non Sq Epi: x / Bacteria: x                RADIOLOGY:    PHYSICAL EXAM:  GEN: No acute distress  LUNGS: Clear to auscultation bilaterally   HEART: S1/S2 present. RRR.   ABD/ GI: Soft, non-tender, non-distended. Bowel sounds present  EXT: NC/NC/NE/2+PP/JANSEN  NEURO: AAOX3

## 2025-03-11 NOTE — PROGRESS NOTE ADULT - ATTENDING COMMENTS
Medicine Attending Addendum  Patient was seen and examined with medicine team.  Nursing records reviewed. I agree with the resident/PA/NP's note including past medical history, home medications, social history, allergies, surgical history, family history, and review of system. I have reviewed relevant vitals, laboratory values, imaging studies, and microbiology.   - Above resident's note was edited by me  - stable to SD home with close follow up private cardiology  - rest of A/P as per detailed housestaff note above except above modifications    Total time spent to complete patient's bedside assessment, reviewed medical chart, discussed medical plan of care with team was 45 minutes with >50% of time spent face to face with patient, discussion with patient/family and/or coordination of care.

## 2025-03-11 NOTE — DIETITIAN INITIAL EVALUATION ADULT - PERTINENT LABORATORY DATA
03-11    142  |  105  |  12  ----------------------------<  103[H]  4.3   |  27  |  1.0    Ca    9.4      11 Mar 2025 04:30  Mg     2.3     03-11    TPro  6.3  /  Alb  4.3  /  TBili  0.8  /  DBili  x   /  AST  19  /  ALT  18  /  AlkPhos  68  03-11

## 2025-03-11 NOTE — DISCHARGE NOTE NURSING/CASE MANAGEMENT/SOCIAL WORK - PATIENT PORTAL LINK FT
You can access the FollowMyHealth Patient Portal offered by [facility] by registering at the following website: [email or web address]. By joining [employer]’s FollowMyHealth portal, you will also be able to view your health information using other applications (apps) compatible with our system.

## 2025-03-11 NOTE — DIETITIAN INITIAL EVALUATION ADULT - OTHER INFO
# Non Cardiac Chest Pain  # NSTEMI OM2 100% occlusion s/p ALESHA ( Jan 2025)   # Hypertensive urgency   # Hx of Hypertension  # Hyperlipidemia

## 2025-03-17 DIAGNOSIS — E78.5 HYPERLIPIDEMIA, UNSPECIFIED: ICD-10-CM

## 2025-03-17 DIAGNOSIS — Z79.82 LONG TERM (CURRENT) USE OF ASPIRIN: ICD-10-CM

## 2025-03-17 DIAGNOSIS — I25.110 ATHEROSCLEROTIC HEART DISEASE OF NATIVE CORONARY ARTERY WITH UNSTABLE ANGINA PECTORIS: ICD-10-CM

## 2025-03-17 DIAGNOSIS — I25.2 OLD MYOCARDIAL INFARCTION: ICD-10-CM

## 2025-03-17 DIAGNOSIS — M94.0 CHONDROCOSTAL JUNCTION SYNDROME [TIETZE]: ICD-10-CM

## 2025-03-17 DIAGNOSIS — Z79.02 LONG TERM (CURRENT) USE OF ANTITHROMBOTICS/ANTIPLATELETS: ICD-10-CM

## 2025-03-17 DIAGNOSIS — I16.0 HYPERTENSIVE URGENCY: ICD-10-CM

## 2025-03-17 DIAGNOSIS — I10 ESSENTIAL (PRIMARY) HYPERTENSION: ICD-10-CM

## 2025-05-29 NOTE — ED PROVIDER NOTE - NS ED MD DISPO ADMITTING SERVICE
CR: Discussed all results with patient.  Patient reporting pulmonary symptoms.  Will give Referral to neurology.
CRITICAL CARE

## 2025-06-23 ENCOUNTER — NON-APPOINTMENT (OUTPATIENT)
Age: 65
End: 2025-06-23